# Patient Record
Sex: MALE | Race: WHITE | Employment: FULL TIME | URBAN - METROPOLITAN AREA
[De-identification: names, ages, dates, MRNs, and addresses within clinical notes are randomized per-mention and may not be internally consistent; named-entity substitution may affect disease eponyms.]

---

## 2021-06-07 LAB
CHOLESTEROL, TOTAL: 168 MG/DL
CHOLESTEROL/HDL RATIO: NORMAL
HDLC SERPL-MCNC: 69 MG/DL (ref 35–70)
LDL CHOLESTEROL CALCULATED: 99 MG/DL (ref 0–160)
NONHDLC SERPL-MCNC: NORMAL MG/DL
TRIGL SERPL-MCNC: 156 MG/DL
VLDLC SERPL CALC-MCNC: NORMAL MG/DL

## 2022-04-12 ENCOUNTER — OFFICE VISIT (OUTPATIENT)
Dept: PRIMARY CARE CLINIC | Age: 57
End: 2022-04-12
Payer: COMMERCIAL

## 2022-04-12 VITALS
SYSTOLIC BLOOD PRESSURE: 156 MMHG | HEIGHT: 68 IN | DIASTOLIC BLOOD PRESSURE: 86 MMHG | HEART RATE: 99 BPM | RESPIRATION RATE: 20 BRPM | BODY MASS INDEX: 23.95 KG/M2 | OXYGEN SATURATION: 100 % | WEIGHT: 158 LBS

## 2022-04-12 DIAGNOSIS — K21.9 GASTROESOPHAGEAL REFLUX DISEASE WITHOUT ESOPHAGITIS: ICD-10-CM

## 2022-04-12 DIAGNOSIS — A41.9 SEPSIS WITH ACUTE RENAL FAILURE, DUE TO UNSPECIFIED ORGANISM, UNSPECIFIED ACUTE RENAL FAILURE TYPE, UNSPECIFIED WHETHER SEPTIC SHOCK PRESENT (HCC): ICD-10-CM

## 2022-04-12 DIAGNOSIS — D63.8 ANEMIA OF CHRONIC DISEASE: ICD-10-CM

## 2022-04-12 DIAGNOSIS — I10 PRIMARY HYPERTENSION: ICD-10-CM

## 2022-04-12 DIAGNOSIS — E87.29 ALCOHOLIC KETOACIDOSIS: ICD-10-CM

## 2022-04-12 DIAGNOSIS — J12.1 RSV (RESPIRATORY SYNCYTIAL VIRUS PNEUMONIA): ICD-10-CM

## 2022-04-12 DIAGNOSIS — A41.9 SEPSIS WITH ACUTE RENAL FAILURE, DUE TO UNSPECIFIED ORGANISM, UNSPECIFIED ACUTE RENAL FAILURE TYPE, UNSPECIFIED WHETHER SEPTIC SHOCK PRESENT (HCC): Primary | ICD-10-CM

## 2022-04-12 DIAGNOSIS — E78.5 DYSLIPIDEMIA: ICD-10-CM

## 2022-04-12 DIAGNOSIS — R65.20 SEPSIS WITH ACUTE RENAL FAILURE, DUE TO UNSPECIFIED ORGANISM, UNSPECIFIED ACUTE RENAL FAILURE TYPE, UNSPECIFIED WHETHER SEPTIC SHOCK PRESENT (HCC): ICD-10-CM

## 2022-04-12 DIAGNOSIS — F10.10 ALCOHOL ABUSE: ICD-10-CM

## 2022-04-12 DIAGNOSIS — R65.20 SEPSIS WITH ACUTE RENAL FAILURE, DUE TO UNSPECIFIED ORGANISM, UNSPECIFIED ACUTE RENAL FAILURE TYPE, UNSPECIFIED WHETHER SEPTIC SHOCK PRESENT (HCC): Primary | ICD-10-CM

## 2022-04-12 DIAGNOSIS — M87.051 AVASCULAR NECROSIS OF BONES OF BOTH HIPS (HCC): ICD-10-CM

## 2022-04-12 DIAGNOSIS — N17.9 SEPSIS WITH ACUTE RENAL FAILURE, DUE TO UNSPECIFIED ORGANISM, UNSPECIFIED ACUTE RENAL FAILURE TYPE, UNSPECIFIED WHETHER SEPTIC SHOCK PRESENT (HCC): Primary | ICD-10-CM

## 2022-04-12 DIAGNOSIS — M87.052 AVASCULAR NECROSIS OF BONES OF BOTH HIPS (HCC): ICD-10-CM

## 2022-04-12 DIAGNOSIS — N17.9 SEPSIS WITH ACUTE RENAL FAILURE, DUE TO UNSPECIFIED ORGANISM, UNSPECIFIED ACUTE RENAL FAILURE TYPE, UNSPECIFIED WHETHER SEPTIC SHOCK PRESENT (HCC): ICD-10-CM

## 2022-04-12 LAB
A/G RATIO: 2.2 (ref 1.1–2.2)
ALBUMIN SERPL-MCNC: 4.8 G/DL (ref 3.4–5)
ALP BLD-CCNC: 78 U/L (ref 40–129)
ALT SERPL-CCNC: 24 U/L (ref 10–40)
ANION GAP SERPL CALCULATED.3IONS-SCNC: 15 MMOL/L (ref 3–16)
AST SERPL-CCNC: 20 U/L (ref 15–37)
BASOPHILS ABSOLUTE: 0.1 K/UL (ref 0–0.2)
BASOPHILS RELATIVE PERCENT: 0.6 %
BILIRUB SERPL-MCNC: 0.5 MG/DL (ref 0–1)
BUN BLDV-MCNC: 18 MG/DL (ref 7–20)
CALCIUM SERPL-MCNC: 10.1 MG/DL (ref 8.3–10.6)
CHLORIDE BLD-SCNC: 96 MMOL/L (ref 99–110)
CO2: 23 MMOL/L (ref 21–32)
CREAT SERPL-MCNC: 1 MG/DL (ref 0.9–1.3)
EOSINOPHILS ABSOLUTE: 0.2 K/UL (ref 0–0.6)
EOSINOPHILS RELATIVE PERCENT: 2 %
GFR AFRICAN AMERICAN: >60
GFR NON-AFRICAN AMERICAN: >60
GLUCOSE BLD-MCNC: 102 MG/DL (ref 70–99)
HCT VFR BLD CALC: 36.7 % (ref 40.5–52.5)
HEMOGLOBIN: 12.5 G/DL (ref 13.5–17.5)
LIPASE: 45 U/L (ref 13–60)
LYMPHOCYTES ABSOLUTE: 1.9 K/UL (ref 1–5.1)
LYMPHOCYTES RELATIVE PERCENT: 15.5 %
MCH RBC QN AUTO: 33.1 PG (ref 26–34)
MCHC RBC AUTO-ENTMCNC: 34 G/DL (ref 31–36)
MCV RBC AUTO: 97.4 FL (ref 80–100)
MONOCYTES ABSOLUTE: 1.1 K/UL (ref 0–1.3)
MONOCYTES RELATIVE PERCENT: 8.5 %
NEUTROPHILS ABSOLUTE: 9.1 K/UL (ref 1.7–7.7)
NEUTROPHILS RELATIVE PERCENT: 73.4 %
PDW BLD-RTO: 12.7 % (ref 12.4–15.4)
PLATELET # BLD: 250 K/UL (ref 135–450)
PMV BLD AUTO: 9.3 FL (ref 5–10.5)
POTASSIUM SERPL-SCNC: 4.8 MMOL/L (ref 3.5–5.1)
RBC # BLD: 3.77 M/UL (ref 4.2–5.9)
SODIUM BLD-SCNC: 134 MMOL/L (ref 136–145)
TOTAL CK: 95 U/L (ref 39–308)
TOTAL PROTEIN: 7 G/DL (ref 6.4–8.2)
TSH REFLEX: 1.07 UIU/ML (ref 0.27–4.2)
WBC # BLD: 12.4 K/UL (ref 4–11)

## 2022-04-12 PROCEDURE — 99205 OFFICE O/P NEW HI 60 MIN: CPT | Performed by: FAMILY MEDICINE

## 2022-04-12 RX ORDER — ASPIRIN 81 MG/1
81 TABLET ORAL DAILY
COMMUNITY

## 2022-04-12 RX ORDER — OMEGA-3S/DHA/EPA/FISH OIL/D3 300MG-1000
400 CAPSULE ORAL DAILY
COMMUNITY

## 2022-04-12 RX ORDER — MAGNESIUM OXIDE 400 MG/1
400 TABLET ORAL
COMMUNITY
Start: 2022-03-14 | End: 2022-09-01

## 2022-04-12 RX ORDER — UREA 10 %
LOTION (ML) TOPICAL
COMMUNITY
Start: 2022-03-14 | End: 2022-09-01

## 2022-04-12 RX ORDER — ASCORBIC ACID 250 MG
250 TABLET ORAL 3 TIMES DAILY
COMMUNITY

## 2022-04-12 RX ORDER — SOD SULF/POT CHLORIDE/MAG SULF 1.479 G
TABLET ORAL
COMMUNITY
End: 2022-09-01

## 2022-04-12 RX ORDER — METOPROLOL SUCCINATE 25 MG/1
25 TABLET, EXTENDED RELEASE ORAL DAILY
COMMUNITY
Start: 2022-03-15 | End: 2022-07-25 | Stop reason: SDUPTHER

## 2022-04-12 RX ORDER — POTASSIUM CHLORIDE 750 MG/1
10 TABLET, FILM COATED, EXTENDED RELEASE ORAL DAILY
COMMUNITY
Start: 2022-03-14 | End: 2022-09-01

## 2022-04-12 SDOH — ECONOMIC STABILITY: FOOD INSECURITY: WITHIN THE PAST 12 MONTHS, YOU WORRIED THAT YOUR FOOD WOULD RUN OUT BEFORE YOU GOT MONEY TO BUY MORE.: NEVER TRUE

## 2022-04-12 SDOH — ECONOMIC STABILITY: FOOD INSECURITY: WITHIN THE PAST 12 MONTHS, THE FOOD YOU BOUGHT JUST DIDN'T LAST AND YOU DIDN'T HAVE MONEY TO GET MORE.: NEVER TRUE

## 2022-04-12 ASSESSMENT — PATIENT HEALTH QUESTIONNAIRE - PHQ9
1. LITTLE INTEREST OR PLEASURE IN DOING THINGS: 0
2. FEELING DOWN, DEPRESSED OR HOPELESS: 0
SUM OF ALL RESPONSES TO PHQ QUESTIONS 1-9: 0
SUM OF ALL RESPONSES TO PHQ9 QUESTIONS 1 & 2: 0

## 2022-04-12 ASSESSMENT — ENCOUNTER SYMPTOMS
DIARRHEA: 0
BLOOD IN STOOL: 0
SORE THROAT: 0
COUGH: 0
SHORTNESS OF BREATH: 0
NAUSEA: 0
VOMITING: 0
ABDOMINAL PAIN: 0

## 2022-04-12 ASSESSMENT — SOCIAL DETERMINANTS OF HEALTH (SDOH): HOW HARD IS IT FOR YOU TO PAY FOR THE VERY BASICS LIKE FOOD, HOUSING, MEDICAL CARE, AND HEATING?: NOT HARD AT ALL

## 2022-04-12 NOTE — PATIENT INSTRUCTIONS
Patient Education      Go to the ER ASAP if you develop any chest pain, shortness of breath, facial droop, slurred speech, weakness/numbness in your arms or legs or R eye blindness! Sepsis: Care Instructions  Overview     Sepsis is an intense reaction to an infection. It can cause damage to the body and lead to dangerously low blood pressure. You may have inflammation across large areas of your body. It can damage tissue and even go deep into yourorgans. Infections that can lead to sepsis include:   A skin infection such as from a cut.  A lung infection like pneumonia.  A kidney infection.  A gut infection such as E. coli. Sepsis is treated with antibiotics. Your doctor will try to find the infection that led to sepsis. Theador Morgan also get fluids through a vein (IV). Machines will track your vital signs, including temperature, blood pressure, breathing rate,and pulse rate. The physical and mental effects of sepsis may not be seen for several weeksafter treatment. And they may last long after the infection is gone. Physical problems may include:   Feeling weak and tired.  Feeling out of breath.  Aches and pains.  Problems with getting around.  Trouble falling asleep or staying asleep.  Dry and itchy skin, brittle nails, and hair loss. Some of these effects can lead to problems with your organs or your feet, legs,hands, or arms. Sepsis can also affect your mind and emotions. Problems may include:   Self-doubt.  Anxiety.  Nightmares.  Depression and mood problems.  Wanting to avoid other people.  Confusion.  Flashbacks and bad memories of your illness. It's important to care for yourself and try to avoid infections. This may loweryour risk of getting sepsis again. Follow-up care is a key part of your treatment and safety. Be sure to make and go to all appointments, and call your doctor if you are having problems.  It's also a good idea to know your test results and keep alist of the medicines you take. How can you care for yourself at home?  Be safe with medicines. Take your medicines exactly as prescribed. Call your doctor if you think you are having a problem with your medicine.  If your doctor prescribed antibiotics, take them as directed. Do not stop taking them just because you feel better. You need to take the full course of antibiotics.  Help prevent infections that could again lead to sepsis. ? Try to avoid colds and flu. If you must be around people who have a cold or the flu, wash your hands often. And get a flu vaccine every year. ? Ask your doctor if you need a pneumococcal vaccine (to prevent pneumonia, meningitis, and other infections). If you have had one before, ask your doctor if you need another dose. ? Clean any wounds or scrapes.  Do not smoke or use other tobacco products. When you quit smoking, you are less likely to get a cold, the flu, bronchitis, and pneumonia. If you need help quitting, talk to your doctor about stop-smoking programs and medicines. These can increase your chances of quitting for good.  Drink plenty of fluids to prevent dehydration. Choose water and other clear liquids until you feel better. If you have kidney, heart, or liver disease and have to limit fluids, talk with your doctor before you increase the amount of fluids you drink.  Eat a healthy diet. Include fruits, vegetables, and whole grains in your diet every day.  If your doctor recommends it, try doing some physical activity. Walking is a good choice. Bit by bit, increase the amount you walk every day.  Talk with your family and friends about your challenges. Ask for help if you need it.  Keep a journal. Writing down your thoughts and feelings can help reduce your stress.  Ask family members to fill in gaps in your memory.  Set small goals for yourself that you can reach. Reward yourself for success. When should you call for help?    Call 911  anytime you think you may need emergency care. For example, call if:     You passed out (lost consciousness). Call your doctor now or seek immediate medical care if:     You have symptoms such as:  ? Shortness of breath. ? Feeling very sick. ? Severe pain. ? A fast heart rate. ? Cool, pale, or clammy skin. ? Feeling confused. ? Feeling very sleepy, or you are hard to wake up.      You are dizzy or lightheaded, or you feel like you may faint.      You have a fever or chills. Watch closely for changes in your health, and be sure to contact your doctor if:     You do not get better as expected. Where can you learn more? Go to https://[employer]eLifestyles. org and sign in to your StarsVu account. Enter G001 in the [employer] box to learn more about \"Sepsis: Care Instructions. \"     If you do not have an account, please click on the \"Sign Up Now\" link. Current as of: July 1, 2021               Content Version: 13.2  © 2006-2022 Healthwise, Incorporated. Care instructions adapted under license by [facility] ([facility]). If you have questions about a medical condition or this instruction, always ask your healthcare professional. [place] [address] any warranty or liability for your use of this information.

## 2022-04-12 NOTE — PROGRESS NOTES
Chief Complaint   Patient presents with    New Patient     NP to establish care    Other     Pt in hospital in [place] for RSV with sepsis, alcoholic ketoacidosis, necrosis of bilateral femur head, chronic kidney disease          Marino Landau is a 61 y.o. male who presents for hospital followup visit. While on vacation in [place] pt had a dry cough, anorexia, diarrhea, lightheadedness and confusion. Pt went to the ER in [place] and was Dx with sepsis, alcoholic ketoacidosis and ARF and was admitted and treated with IVFs and IV antibiotics for 48 hours with improvement and was discharged home on a course of Augmentin antibiotic and atrovent inhaler as needed.  Pt was found to have B/L hip avascular necrosis in the hospital    Pt has a hx of HTN and dyslipidemia and was on losartan, hctz, lipitor 80 mg and a Baby ASA  but during his hospitalization his BP medications were discontinued and he was eventually stated on lopressor 25 mg bid medication for BP control    Pt denies any hx of CAD, kidney disease or diabetes    Pt is on prilosec for GERD and has a hx of chronic anemia and is followed by Hematology    Upon hospital discharge pt was started on mag oxide 094 mg bid, folic acid 1 mg daily, FESO4 325 mg TID and potassium 10 Meq daily    bloodwork upon discharge 03/14/2022 :  glucose 157 BUN 8  Na 132  pot 4.0  Cl 107 CO2 19 creat 0.89  calcium 8.3  alk phos 54  tot prot 6.8  albumin 3.4  ALT 28  AST 31  t bili 0.4  WBC 12.9  HgB 9.4  MCV 94.7 platelets 662    Pt had a colonoscopy 12/30/2021 which was normal except fpor several benign polyps and mild diverticulosis    Pt has a hx of low testosterone and used to be on shots in the past but quit taking them about 4 years ago    Pt has 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    Pt has a hx of basal cell cancer nose and uvula s/p excision 2015 and did not require any chemo or radiation and pt Quit smoking at that time    Surgical hx includes L5 - S1 lumbar disc surgery 20 years ago    Pt did complete his COVID vaccine series    Pt is an exsmoker 1 PPD x 30 years and was using alcohol 8 - 10 beers and 2 shots daily for 30 years and stayed sober for 1 month s/p hospitalization but has resumed drinking 2 beers 3 times a week; Pt smokes marijuana daily    FHx positive for alcoholism and early CAD(father) and glaucoma (mother)       Hospital admission note 03/12/2022  Kayleen Garcia is a 61 y.o. male, visiting from [place], with a history of hyperlipidemia, hypertension, anemia, and alcoholism, who presented to the ED earlier this morning with complaints of persistent cough, mild shortness of breath, weakness, nausea vomiting, diarrhea, body aches, and lightheadedness for the past 4 days. Patient states that he flew in from [place] 4 days ago for vacation, and his symptoms started shortly prior to his flight. He states that he was having a family dinner at the [employer] in [place] and started to notice his symptoms develop later that night. He does not endorse eating any uncooked/raw foods recently. The patient also denies being exposed to any sick contacts, immunocompromise contacts, and he does not have any children in the household. He currently denies any chest pain, fever, or any other complaints besides those listed above. Patient states that he is currently fully vaccinated and boosted against COVID-19 as well as his wife, who is present at bedside. The patient states that he and his wife have been drinking \"socially\" since flying in. He states that he usually drinks 2-3 cocktails along with some shots of hard liquor in their room prior to going out. He also states that the drinks socially at home with tendency to binge drink at times. He admits that they do not drink often, but when they do, they drink a large amount of alcohol.  Him and his wife seem pretty open to the idea that they are likely suffering from alcoholism and unwilling to listen to counseling and rehab suggestions. He also admits that he has been unable to hold down any food due to being sick for the last 4 days. They are supposed to fly back to [facility] in 3 days. In the ED, the patient was found to have several abnormal labs. His white blood cell count was greatly elevated at 23.4. His creatinine was elevated at 1.7. His serum CO2 was found to be decreased at 14 and anion gap elevated at 16. Potassium was also low at 2.9 and magnesium was undetectable. Ammonia level was also elevated at 41, and lactic acid elevated at 3.0. Beta hydroxybutyrate was also elevated at 4.14. A nasal swab returned positive for RSV. The patient was informed of the results and informed that he will be admitted to the [facility] for alcoholic ketosis as well as sepsis, suspect secondary to RSV. Principal Problem:  Severe sepsis ([facility] [address] Risk Code)    1. Severe Sepsis with Lactic Acidosis, suspect 2nd to RSV, rule out bacterial/fungal infections: Improving.   --Will provide maintenance IVF   --Will give IV Vanc and IV Zosyn  --Blood cultures obtained, results pending  --Urine cultures obtained, results pending. --F/u LA, ESR, CRP, and procalcitonin level prn  --Check BMP, Mag, Phos levels q6hrs  --Lactic acid initially elevated at 2.9. LA improved to 1.2 on 3/13. --Will provide NC O2 prn for resp support. --Monitor on tele   --Tylenol for fever and Ultram for moderate to severe pain   --NS Bolus prn for resuscitation   --Daily CBC, CMP, Mg, P levels. --Droplet precautions  --Management of RSV infection as listed below     2. SOB with productive cough, found to have RSV Pneumonia. --Patient started on IV Zosyn for prevention of bacterial pneumonia.    --Will obtain sputum culture and Gram stain and RPP  --Will repeat chest x-ray and ABG prn  --Will provide duoneb breathing treatments every 6 hours   --Will provide nasal cannula oxygen when necessary for breathing support. --Continue tramadol and tylenol when necessary for pain  --Continue pulse oximetry would vitals every 4 hours. If Patient continues to desat, may need continuous pulse oximetry. --Will provide Mucinex and robitussin when necessary for cough. --Droplet precautions     3. Alcoholic Ketosis/Metabolic Acidosis 2nd Acute on Chronic alcohol abuse/binge drinking:  -Patient admits to binge drinking at times especially while on vacation. Drinks several shots prior to going out and drinking more. -Beta hydroxybutyrate level elevated at 4.14 with elevated lactic acid level  - No evidence of withdrawal or DTs at this time   - UDS pending, BAL <11  - multivitamin, folic acid, thiamine  - Monitor electrolytes   - Ok to use amp of Bicarb prn for treatment of acidosis ONCE the magnesium and Potassium levels are nearly corrected (K >3.3, Mag >1.6)  - CIWA protocol with IV/PO Ativan prn for symptom triggered therapy as needed  - Seizure precautions  - monitor for evidence of withdrawal   - Patient educated extensively to refrain from alcohol abuse for greater than 15 minutes  - Provide aggressive IV fluids for treatment of alcoholic ketosis  - Check BMP, mag, Phos levels every 6 hours until acidosis has resolved. - SW and MAT program consulted      4. Hypokalemia:  Severe Hypomagnesemia:  --Replace as indicated  --f/u BMP in am.   --Monitor vitals. --Patient started on IMCU replacement protocol   --Repeat EKG prn   --Patient started on Magnesium supplementation with 400 mg p.o. twice daily     5. Acute renal failure   Mild Rhabdomyolysis  --Suspect secondary to alcoholic ketosis along with sepsis/infection. --Current crt is 1.7. Baseline is <1.2  --Will provide aggressive hydration   --Monitor BMP every 6 hours   --Avoid Nephrotoxins   --Should improve with treatment of infections and management of alcoholic ketosis and cessation from alcohol     6. Hyperammonemia: Improving  --NH 41 on admission.  Has PLATELIKE ATELECTASIS. DIVERTICULOSIS WITHOUT CT EVIDENCE OF DIVERTICULITIS. BILATERAL LEFT GREATER THAN RIGHT FEMORAL HEAD AVASCULAR NECROSIS WITHOUT   COLLAPSE. Review of Systems   Constitutional: Negative for fatigue and fever. HENT: Positive for congestion (at times and uses OTC flonase with good control). Negative for nosebleeds and sore throat. Eyes:        Positive blindness in L eye   Respiratory: Negative for cough and shortness of breath. Cardiovascular: Positive for leg swelling (at times). Negative for chest pain and palpitations. Gastrointestinal: Negative for abdominal pain, blood in stool, diarrhea, nausea and vomiting. Positive melena from iron pills; Negative indigestion   Endocrine: Negative for polydipsia and polyuria. Genitourinary: Negative for dysuria and hematuria. Musculoskeletal: Negative for arthralgias. Skin: Negative for rash. Neurological: Negative for dizziness, seizures, syncope, speech difficulty, weakness and headaches. Psychiatric/Behavioral: Negative for dysphoric mood. The patient is not nervous/anxious. Vitals:    04/12/22 1325   BP: (!) 156/86   Pulse:    Resp:    SpO2:          Physical Exam  Vitals reviewed. Constitutional:       General: He is not in acute distress. HENT:      Mouth/Throat:      Mouth: Mucous membranes are moist.      Pharynx: Oropharynx is clear. Eyes:      General: No scleral icterus. Comments: Pink conjunctivae    Neck:      Thyroid: No thyromegaly. Comments: No carotid bruits noted B/L  Cardiovascular:      Heart sounds: Normal heart sounds. No murmur heard. No friction rub. No gallop. Pulmonary:      Effort: Pulmonary effort is normal.      Breath sounds: Normal breath sounds. Abdominal:      Palpations: Abdomen is soft. Tenderness: There is no abdominal tenderness. Musculoskeletal:      Comments: No leg edema noted B/L   Lymphadenopathy:      Cervical: No cervical adenopathy. Skin:     Findings: No rash. Neurological:      Mental Status: He is alert. Comments: Cranial nerves 2 - 12 grossly intact; Muscle strength 5/5 throughout; No asterixis noted   Psychiatric:         Mood and Affect: Mood normal.         ASSESSMENT AND PLAN    1. Gastroesophageal reflux disease without esophagitis  Patient clinically stable on present medications and is with a nontender abdomen on PE and his reported melena is secondary to his Fe supplementation  - CBC with Auto Differential; Future    2. Primary hypertension  Pt with decent control on B blocker and denies any CP or SOB  - CBC with Auto Differential; Future  - Comprehensive Metabolic Panel; Future    3. Dyslipidemia  Patient clinically stable on statin medication and is with a nonfocal neuro exam and will check CPK level bloodwork  - Comprehensive Metabolic Panel; Future    4. Alcohol abuse  Pt was warned of further health issues with continued abuse and has decreased his intake to 2 beers 3 times a week. Pt is with anicteric sclerae and no asterixis on PE   - Comprehensive Metabolic Panel; Future  - Lipase; Future    5. Sepsis with acute renal failure, due to unspecified organism, unspecified acute renal failure type, unspecified whether septic shock present ([facility] [address]  Pt clinically improved s/p treatment with IVFs and IV antibiotics and denies any fever and is with stable VS  - CBC with Auto Differential; Future  - Comprehensive Metabolic Panel; Future    6. RSV (respiratory syncytial virus pneumonia)  Pt clinically improved with albuterol nebs and is with clear lung fields on PE    7. Alcoholic ketoacidosis  Will check bloodwork to insure stable electrolytes and kidney function  - CBC with Auto Differential; Future  - CK; Future  - Comprehensive Metabolic Panel; Future  - Lipase; Future  - TSH with Reflex; Future    8.  Anemia of chronic disease  Pt is followed by Hematology and most likely is multifactorial in nature and need to obtain recent correspondence for review  - Testosterone; Future    9. Avascular necrosis of bones of both hips (HCC)  Pt denies any hip pain and most likely is secondary to pt's alcohol abuse and will Refer pt to Ortho for evaluation  - Orlando Murillo MD, Orthopedic Surgery, [facility]        Emily Kaplan MD      Return in about 6 weeks (around 5/24/2022). Patient Instructions       Patient Education      Go to the ER ASAP if you develop any chest pain, shortness of breath, facial droop, slurred speech, weakness/numbness in your arms or legs or R eye blindness! Sepsis: Care Instructions  Overview     Sepsis is an intense reaction to an infection. It can cause damage to the body and lead to dangerously low blood pressure. You may have inflammation across large areas of your body. It can damage tissue and even go deep into yourorgans. Infections that can lead to sepsis include:   A skin infection such as from a cut.  A lung infection like pneumonia.  A kidney infection.  A gut infection such as E. coli. Sepsis is treated with antibiotics. Your doctor will try to find the infection that led to sepsis. Juan Daniel Blankenship also get fluids through a vein (IV). Machines will track your vital signs, including temperature, blood pressure, breathing rate,and pulse rate. The physical and mental effects of sepsis may not be seen for several weeksafter treatment. And they may last long after the infection is gone. Physical problems may include:   Feeling weak and tired.  Feeling out of breath.  Aches and pains.  Problems with getting around.  Trouble falling asleep or staying asleep.  Dry and itchy skin, brittle nails, and hair loss. Some of these effects can lead to problems with your organs or your feet, legs,hands, or arms. Sepsis can also affect your mind and emotions. Problems may include:   Self-doubt.  Anxiety.  Nightmares.  Depression and mood problems.    Wanting to avoid other people.  Confusion.  Flashbacks and bad memories of your illness. It's important to care for yourself and try to avoid infections. This may loweryour risk of getting sepsis again. Follow-up care is a key part of your treatment and safety. Be sure to make and go to all appointments, and call your doctor if you are having problems. It's also a good idea to know your test results and keep alist of the medicines you take. How can you care for yourself at home?  Be safe with medicines. Take your medicines exactly as prescribed. Call your doctor if you think you are having a problem with your medicine.  If your doctor prescribed antibiotics, take them as directed. Do not stop taking them just because you feel better. You need to take the full course of antibiotics.  Help prevent infections that could again lead to sepsis. ? Try to avoid colds and flu. If you must be around people who have a cold or the flu, wash your hands often. And get a flu vaccine every year. ? Ask your doctor if you need a pneumococcal vaccine (to prevent pneumonia, meningitis, and other infections). If you have had one before, ask your doctor if you need another dose. ? Clean any wounds or scrapes.  Do not smoke or use other tobacco products. When you quit smoking, you are less likely to get a cold, the flu, bronchitis, and pneumonia. If you need help quitting, talk to your doctor about stop-smoking programs and medicines. These can increase your chances of quitting for good.  Drink plenty of fluids to prevent dehydration. Choose water and other clear liquids until you feel better. If you have kidney, heart, or liver disease and have to limit fluids, talk with your doctor before you increase the amount of fluids you drink.  Eat a healthy diet. Include fruits, vegetables, and whole grains in your diet every day.  If your doctor recommends it, try doing some physical activity. Walking is a good choice.  Bit by bit, increase the amount you walk every day.  Talk with your family and friends about your challenges. Ask for help if you need it.  Keep a journal. Writing down your thoughts and feelings can help reduce your stress.  Ask family members to fill in gaps in your memory.  Set small goals for yourself that you can reach. Reward yourself for success. When should you call for help? Call 911  anytime you think you may need emergency care. For example, call if:     You passed out (lost consciousness). Call your doctor now or seek immediate medical care if:     You have symptoms such as:  ? Shortness of breath. ? Feeling very sick. ? Severe pain. ? A fast heart rate. ? Cool, pale, or clammy skin. ? Feeling confused. ? Feeling very sleepy, or you are hard to wake up.      You are dizzy or lightheaded, or you feel like you may faint.      You have a fever or chills. Watch closely for changes in your health, and be sure to contact your doctor if:     You do not get better as expected. Where can you learn more? Go to https://[employer]. org and sign in to your Co-Work account. Enter T784 in the [employer] box to learn more about \"Sepsis: Care Instructions. \"     If you do not have an account, please click on the \"Sign Up Now\" link. Current as of: July 1, 2021               Content Version: 13.2  © 2624-3823 Healthwise, Incorporated. Care instructions adapted under license by [facility] ([facility]). If you have questions about a medical condition or this instruction, always ask your healthcare professional. [address] any warranty or liability for your use of this information.

## 2022-04-14 LAB — TESTOSTERONE TOTAL: 186 NG/DL (ref 220–1000)

## 2022-04-18 DIAGNOSIS — R79.89 LOW TESTOSTERONE IN MALE: Primary | ICD-10-CM

## 2022-05-26 ENCOUNTER — OFFICE VISIT (OUTPATIENT)
Dept: PRIMARY CARE CLINIC | Age: 57
End: 2022-05-26
Payer: COMMERCIAL

## 2022-05-26 VITALS
DIASTOLIC BLOOD PRESSURE: 78 MMHG | OXYGEN SATURATION: 98 % | TEMPERATURE: 97.9 F | BODY MASS INDEX: 25.76 KG/M2 | RESPIRATION RATE: 16 BRPM | SYSTOLIC BLOOD PRESSURE: 134 MMHG | HEART RATE: 68 BPM | HEIGHT: 68 IN | WEIGHT: 170 LBS

## 2022-05-26 DIAGNOSIS — M87.051 AVASCULAR NECROSIS OF BONES OF BOTH HIPS (HCC): ICD-10-CM

## 2022-05-26 DIAGNOSIS — E78.5 DYSLIPIDEMIA: ICD-10-CM

## 2022-05-26 DIAGNOSIS — M87.052 AVASCULAR NECROSIS OF BONES OF BOTH HIPS (HCC): ICD-10-CM

## 2022-05-26 DIAGNOSIS — I10 PRIMARY HYPERTENSION: Primary | ICD-10-CM

## 2022-05-26 DIAGNOSIS — F10.10 ALCOHOL ABUSE: ICD-10-CM

## 2022-05-26 DIAGNOSIS — R79.89 LOW TESTOSTERONE IN MALE: ICD-10-CM

## 2022-05-26 DIAGNOSIS — K21.9 GASTROESOPHAGEAL REFLUX DISEASE WITHOUT ESOPHAGITIS: ICD-10-CM

## 2022-05-26 PROCEDURE — 99214 OFFICE O/P EST MOD 30 MIN: CPT | Performed by: FAMILY MEDICINE

## 2022-05-26 ASSESSMENT — ENCOUNTER SYMPTOMS
COUGH: 0
BLOOD IN STOOL: 0
VOMITING: 0
SORE THROAT: 0
SHORTNESS OF BREATH: 0
ABDOMINAL PAIN: 0
NAUSEA: 0

## 2022-05-26 NOTE — PATIENT INSTRUCTIONS
Patient Education      Go to the ER ASAP if you develop any chest pain, shortness of breath, facial droop, slurred speech or weakness/numbness in your arms or legs! High Blood Pressure: Care Instructions  Overview     It's normal for blood pressure to go up and down throughout the day. But if it stays up, you have high blood pressure. Another name for high blood pressure ishypertension. Despite what a lot of people think, high blood pressure usually doesn't cause headaches or make you feel dizzy or lightheaded. It usually has no symptoms. But it does increase your risk of stroke, heart attack, and other problems. You and your doctor will talk about your risks of these problems based on yourblood pressure. Your doctor will give you a goal for your blood pressure. Your goal will bebased on your health and your age. Lifestyle changes, such as eating healthy and being active, are always important to help lower blood pressure. You might also take medicine to reachyour blood pressure goal.  Follow-up care is a key part of your treatment and safety. Be sure to make and go to all appointments, and call your doctor if you are having problems. It's also a good idea to know your test results and keep alist of the medicines you take. How can you care for yourself at home? Medical treatment   If you stop taking your medicine, your blood pressure will go back up. You may take one or more types of medicine to lower your blood pressure. Be safe with medicines. Take your medicine exactly as prescribed. Call your doctor if you think you are having a problem with your medicine.  Talk to your doctor before you start taking aspirin every day. Aspirin can help certain people lower their risk of a heart attack or stroke. But taking aspirin isn't right for everyone, because it can cause serious bleeding.  See your doctor regularly.  You may need to see the doctor more often at first or until your blood pressure comes down.   If you are taking blood pressure medicine, talk to your doctor before you take decongestants or anti-inflammatory medicine, such as ibuprofen. Some of these medicines can raise blood pressure.  Learn how to check your blood pressure at home. Lifestyle changes   Stay at a healthy weight. This is especially important if you put on weight around the waist. Losing even 10 pounds can help you lower your blood pressure.  If your doctor recommends it, get more exercise. Walking is a good choice. Bit by bit, increase the amount you walk every day. Try for at least 30 minutes on most days of the week. You also may want to swim, bike, or do other activities.  Avoid or limit alcohol. Talk to your doctor about whether you can drink any alcohol.  Try to limit how much sodium you eat to less than 2,300 milligrams (mg) a day. Your doctor may ask you to try to eat less than 1,500 mg a day.  Eat plenty of fruits (such as bananas and oranges), vegetables, legumes, whole grains, and low-fat dairy products.  Lower the amount of saturated fat in your diet. Saturated fat is found in animal products such as milk, cheese, and meat. Limiting these foods may help you lose weight and also lower your risk for heart disease.  Do not smoke. Smoking increases your risk for heart attack and stroke. If you need help quitting, talk to your doctor about stop-smoking programs and medicines. These can increase your chances of quitting for good. When should you call for help? Call 911  anytime you think you may need emergency care. This may mean having symptoms that suggest that your blood pressure is causing a serious heart or blood vessel problem. Your blood pressure may be over 180/120. For example, call 911 if:     You have symptoms of a heart attack. These may include:  ? Chest pain or pressure, or a strange feeling in the chest.  ? Sweating. ? Shortness of breath. ? Nausea or vomiting.   ? Pain, pressure, or a strange feeling in the back, neck, jaw, or upper belly or in one or both shoulders or arms. ? Lightheadedness or sudden weakness. ? A fast or irregular heartbeat.      You have symptoms of a stroke. These may include:  ? Sudden numbness, tingling, weakness, or loss of movement in your face, arm, or leg, especially on only one side of your body. ? Sudden vision changes. ? Sudden trouble speaking. ? Sudden confusion or trouble understanding simple statements. ? Sudden problems with walking or balance. ? A sudden, severe headache that is different from past headaches.      You have severe back or belly pain. Do not wait until your blood pressure comes down on its own. Get help right away. Call your doctor now or seek immediate care if:     Your blood pressure is much higher than normal (such as 180/120 or higher), but you don't have symptoms.      You think high blood pressure is causing symptoms, such as:  ? Severe headache.  ? Blurry vision. Watch closely for changes in your health, and be sure to contact your doctor if:     Your blood pressure measures higher than your doctor recommends at least 2 times. That means the top number is higher or the bottom number is higher, or both.      You think you may be having side effects from your blood pressure medicine. Where can you learn more? Go to https://FreeMonee.Teachernow. org and sign in to your [employer]ern[employer] account. Enter O488 in the [employer] box to learn more about \"High Blood Pressure: Care Instructions. \"     If you do not have an account, please click on the \"Sign Up Now\" link. Current as of: January 10, 2022               Content Version: 13.2  © 6360-6094 Healthwise, Incorporated. Care instructions adapted under license by [facility] ([facility]).  If you have questions about a medical condition or this instruction, always ask your healthcare professional. [address]  any warranty or liability for your use of this information.

## 2022-05-26 NOTE — PROGRESS NOTES
Chief Complaint   Patient presents with    Follow-up     pt here for follow up visit         Felipe John is a 61 y.o. male who presents for followup visit regarding HTN, dyslipidemia, GERD and alcohol abuse. Pt did not see Ortho yet regarding his hip avascular necrosis    Patient did recent bloodwork [CBC, CMP, CK, lipase, TSH, testosterone] which was unremarkable except for a low testosterone level 186 and will thus refer patient to Urology for evaluation and treatment. Patient also is with an elevated WBC 12.4 (with neutrophils 9.1) along with a mild normocytic anemia with HgB 12.5 which is most likely secondary to his recent sepsis and will repeat CBC bloodwork at his next office visit ---- Pt did see Urology and was started on testosterone gel daily    Pt has a hx of HTN and dyslipidemia and is on lopressor 25 mg bid medication  and a Baby ASA     Pt denies any hx of CAD, kidney disease or diabetes     Pt is on prilosec for GERD and has a hx of chronic anemia and is followed by Hematology    Pt had a colonoscopy 12/30/2021 which was normal except for several benign polyps and mild diverticulosis     Pt has a hx of low testosterone and used to be on shots in the past but quit taking them about 4 years ago     Pt has 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     Pt has a hx of basal cell cancer nose and uvula s/p excision 2015 and did not require any chemo or radiation and pt Quit smoking at that time and is followed by Dermatology     Surgical hx includes L5 - S1 lumbar disc surgery 20 years ago     Pt did complete his COVID vaccine series     Pt is an exsmoker 1 PPD x 30 years and was using alcohol 8 - 10 beers and 2 shots daily for 30 years and stayed sober for 1 month s/p hospitalization but has resumed drinking 2 beers 3 times a week;  Pt smokes marijuana daily     FHx positive for alcoholism and early CAD(father) and glaucoma (mother)       Review of Systems Muscle strength 5/5 throughout   Psychiatric:         Mood and Affect: Mood normal.         ASSESSMENT AND PLAN    1. Primary hypertension  Patient clinically stable on present medications and denies any CP or SOB and had recent normal CMP bloodwork    2. Low testosterone in male  Pt is being managed by Urology    3. Alcohol abuse  Pt was told to limit his intake to at most 6 drinks weekly    4. Gastroesophageal reflux disease without esophagitis  Patient clinically stable on present medications and is with a nontender abdomen on PE    5. Dyslipidemia  Patient clinically stable on present medications and is with a nonfocal neuro exam    6. Avascular necrosis of bones of both hips ([address]  Pt was told to see Ortho as directed for evaluation        Sukhdeep Leigh MD      Return in about 3 months (around 8/26/2022). Patient Instructions       Patient Education      Go to the ER ASAP if you develop any chest pain, shortness of breath, facial droop, slurred speech or weakness/numbness in your arms or legs! High Blood Pressure: Care Instructions  Overview     It's normal for blood pressure to go up and down throughout the day. But if it stays up, you have high blood pressure. Another name for high blood pressure ishypertension. Despite what a lot of people think, high blood pressure usually doesn't cause headaches or make you feel dizzy or lightheaded. It usually has no symptoms. But it does increase your risk of stroke, heart attack, and other problems. You and your doctor will talk about your risks of these problems based on yourblood pressure. Your doctor will give you a goal for your blood pressure. Your goal will bebased on your health and your age. Lifestyle changes, such as eating healthy and being active, are always important to help lower blood pressure. You might also take medicine to reachyour blood pressure goal.  Follow-up care is a key part of your treatment and safety.  Be sure to make and go to all appointments, and call your doctor if you are having problems. It's also a good idea to know your test results and keep alist of the medicines you take. How can you care for yourself at home? Medical treatment   If you stop taking your medicine, your blood pressure will go back up. You may take one or more types of medicine to lower your blood pressure. Be safe with medicines. Take your medicine exactly as prescribed. Call your doctor if you think you are having a problem with your medicine.  Talk to your doctor before you start taking aspirin every day. Aspirin can help certain people lower their risk of a heart attack or stroke. But taking aspirin isn't right for everyone, because it can cause serious bleeding.  See your doctor regularly. You may need to see the doctor more often at first or until your blood pressure comes down.  If you are taking blood pressure medicine, talk to your doctor before you take decongestants or anti-inflammatory medicine, such as ibuprofen. Some of these medicines can raise blood pressure.  Learn how to check your blood pressure at home. Lifestyle changes   Stay at a healthy weight. This is especially important if you put on weight around the waist. Losing even 10 pounds can help you lower your blood pressure.  If your doctor recommends it, get more exercise. Walking is a good choice. Bit by bit, increase the amount you walk every day. Try for at least 30 minutes on most days of the week. You also may want to swim, bike, or do other activities.  Avoid or limit alcohol. Talk to your doctor about whether you can drink any alcohol.  Try to limit how much sodium you eat to less than 2,300 milligrams (mg) a day. Your doctor may ask you to try to eat less than 1,500 mg a day.  Eat plenty of fruits (such as bananas and oranges), vegetables, legumes, whole grains, and low-fat dairy products.  Lower the amount of saturated fat in your diet.  Saturated fat is found in animal products such as milk, cheese, and meat. Limiting these foods may help you lose weight and also lower your risk for heart disease.  Do not smoke. Smoking increases your risk for heart attack and stroke. If you need help quitting, talk to your doctor about stop-smoking programs and medicines. These can increase your chances of quitting for good. When should you call for help? Call 911  anytime you think you may need emergency care. This may mean having symptoms that suggest that your blood pressure is causing a serious heart or blood vessel problem. Your blood pressure may be over 180/120. For example, call 911 if:     You have symptoms of a heart attack. These may include:  ? Chest pain or pressure, or a strange feeling in the chest.  ? Sweating. ? Shortness of breath. ? Nausea or vomiting. ? Pain, pressure, or a strange feeling in the back, neck, jaw, or upper belly or in one or both shoulders or arms. ? Lightheadedness or sudden weakness. ? A fast or irregular heartbeat.      You have symptoms of a stroke. These may include:  ? Sudden numbness, tingling, weakness, or loss of movement in your face, arm, or leg, especially on only one side of your body. ? Sudden vision changes. ? Sudden trouble speaking. ? Sudden confusion or trouble understanding simple statements. ? Sudden problems with walking or balance. ? A sudden, severe headache that is different from past headaches.      You have severe back or belly pain. Do not wait until your blood pressure comes down on its own. Get help right away. Call your doctor now or seek immediate care if:     Your blood pressure is much higher than normal (such as 180/120 or higher), but you don't have symptoms.      You think high blood pressure is causing symptoms, such as:  ? Severe headache.  ? Blurry vision.    Watch closely for changes in your health, and be sure to contact your doctor if:     Your blood pressure measures higher than your doctor recommends at least 2 times. That means the top number is higher or the bottom number is higher, or both.      You think you may be having side effects from your blood pressure medicine. Where can you learn more? Go to https://ResiModelmaxineImmediatelyeb.BioCatch. org and sign in to your Healthcentrix account. Enter D409 in the [employer]ik box to learn more about \"High Blood Pressure: Care Instructions. \"     If you do not have an account, please click on the \"Sign Up Now\" link. Current as of: January 10, 2022               Content Version: 13.2  © 1345-6429 Healthwise, Incorporated. Care instructions adapted under license by [facility] ([facility]). If you have questions about a medical condition or this instruction, always ask your healthcare professional. [address] any warranty or liability for your use of this information.

## 2022-06-02 ENCOUNTER — OFFICE VISIT (OUTPATIENT)
Dept: ORTHOPEDIC SURGERY | Age: 57
End: 2022-06-02

## 2022-06-02 VITALS — WEIGHT: 170 LBS | HEIGHT: 68 IN | BODY MASS INDEX: 25.76 KG/M2

## 2022-06-02 DIAGNOSIS — M25.552 LEFT HIP PAIN: Primary | ICD-10-CM

## 2022-06-02 DIAGNOSIS — M25.551 RIGHT HIP PAIN: ICD-10-CM

## 2022-06-02 PROCEDURE — 99203 OFFICE O/P NEW LOW 30 MIN: CPT | Performed by: ORTHOPAEDIC SURGERY

## 2022-06-02 NOTE — Clinical Note
Dear Dr. Isidra Rogers,    Thank you very much for the referral and allowing me to participate in this patient's care. Please see the attached note for full details of the visit.     With kind regards,  Glory Marin MD

## 2022-06-02 NOTE — PROGRESS NOTES
2022     Reason for visit:  Bilateral hip evaluation    History of Present Illness: The patient is a 69-year-old male who presents for evaluation of his bilateral hips. He presents as a referral from Dr. Torie Gaming. He reports that while he was in [place] he was having chest issues and as result of work-up it sounds like he had a CT scan of the chest abdomen pelvis. It also sounds like he likely had findings consistent with AVN of the bilateral hips that was found incidentally on the scan. So he presents for further evaluation and work-up. He denies hip pain. He denies long-term oral steroid use. However he does admit to a history of heavy alcohol usage. He has been able to decrease his usage in recent years but overall does report high levels of alcohol intake.     Medical History:  Past Medical History:   Diagnosis Date    ED (erectile dysfunction)     FH: CAD (coronary artery disease)     GERD (gastroesophageal reflux disease)     HLD (hyperlipidemia)     HTN (hypertension)     MTHFR mutation     heterozygous; takes vitamins (B6, B12, folate)      Past Surgical History:   Procedure Laterality Date    SKIN CANCER EXCISION  2015    Melanoma on face    SPINE SURGERY      UVULECTOMY  2015    WISDOM TOOTH EXTRACTION        Family History   Problem Relation Age of Onset    Heart Attack Father     Heart Disease Father     Hypertension Mother    [facility] Elevated Lipids Sister     Elevated Lipids Father       Social History     Socioeconomic History    Marital status:      Spouse name: Not on file    Number of children: Not on file    Years of education: Not on file    Highest education level: Not on file   Occupational History    Not on file   Tobacco Use    Smoking status: Former Smoker     Packs/day: 1.00     Years: 30.00     Pack years: 30.00     Types: Cigarettes     Quit date:      Years since quittin.4    Smokeless tobacco: Never Used   Substance and Sexual Activity    Alcohol use: Yes     Alcohol/week: 98.0 standard drinks     Types: 56 Cans of beer, 42 Shots of liquor per week     Comment: Pt previous use of alcohol daily x35 years 6 shots per night along with 8 beers, recently cut down to occasional use    Drug use: Yes     Frequency: 7.0 times per week     Types: Marijuana [place])     Comment: daily use-no medical card    Sexual activity: Not on file   Other Topics Concern    Not on file   Social History Narrative    Not on file     Social Determinants of Health     Financial Resource Strain: Low Risk     Difficulty of Paying Living Expenses: Not hard at all   Food Insecurity: No Food Insecurity    Worried About Running Out of Food in the Last Year: Never true    [place] of Food in the Last Year: Never true   Transportation Needs:     Lack of Transportation (Medical): Not on file    Lack of Transportation (Non-Medical):  Not on file   Physical Activity:     Days of Exercise per Week: Not on file    Minutes of Exercise per Session: Not on file   Stress:     Feeling of Stress : Not on file   Social Connections:     Frequency of Communication with Friends and Family: Not on file    Frequency of Social Gatherings with Friends and Family: Not on file    Attends Jain Services: Not on file    Active Member of [address] [place] [address] [employer] or Organizations: Not on file    Attends Club or Organization Meetings: Not on file    Marital Status: Not on file   Intimate Partner Violence:     Fear of Current or Ex-Partner: Not on file    Emotionally Abused: Not on file    Physically Abused: Not on file    Sexually Abused: Not on file   Housing Stability:     Unable to Pay for Housing in the Last Year: Not on file    Number of [place] in the Last Year: Not on file    Unstable Housing in the Last Year: Not on file      Current Outpatient Medications on File Prior to Visit   Medication Sig Dispense Refill    aspirin 81 MG EC tablet Take 81 mg by mouth daily      Multiple Vitamin or induration. Vascular: Examination reveals no swelling or calf tenderness. Peripheral pulses are palpable and 2+. Neurological: The patient has good coordination. There is no weakness or sensory deficit. Skin:  Head/Neck: inspection reveals no rashes, ulcerations or lesions. Trunk: inspection reveals no rashes, ulcerations or lesions. Objective:  Ht 5' 8\" (1.727 m)   Wt 170 lb (77.1 kg)   BMI 25.85 kg/m²      Physical Exam:  The patient is well-appearing and in no apparent distress  Examination of the right and left hips  Range of motion reveals 100 degrees of flexion with internal rotation of 10 degrees and external rotation of 30 to 40 degrees, no pain with internal or external rotation of the hip, negative Flako Kulwinder, negative Stinchfield, negative impingement test  5 out of 5 strength throughout distal muscle groups  Sensation is intact to light touch throughout all distributions  There is no calf swelling or tenderness  Palpable DP pulse, brisk cap refill, 2+ symmetric reflexes    Imaging:  AP of pelvis x-ray as well as AP and lateral x-rays of the left and right hips were obtained in the office today on 6/2/2022. Preserved joint spaces. Subtle irregularity of the femoral head bilaterally concerning for possible early AVN. No evidence of collapse. Assessment:  Suspect bilateral hip AVN    Plan:  I discussed with the patient the differential diagnosis. At this point I would recommend further evaluation with an MRI of both the left and right hips. He is in agreement. Following the study he will call us for the results and neck steps. Greater than 45 minutes were spent with this encounter. Time spent included evaluating the patient's chart prior to arrival.  Evaluating the patient in the office including history, physical examination, imaging reviewing, and counseling on next steps. Lastly, time was spent discussing orders with my staff as well as providing documentation in the chart. Juma Mansfield MD            Orthopaedic Surgery Sports Medicine and [address] and [address] [facility]            Team Physician [facility] ([place]      Disclaimer: This note was dictated with voice recognition software. Though review and correction are routine, we apologize for any errors.

## 2022-06-03 ENCOUNTER — TELEPHONE (OUTPATIENT)
Dept: ORTHOPEDIC SURGERY | Age: 57
End: 2022-06-03

## 2022-06-03 NOTE — TELEPHONE ENCOUNTER
MRI LT-RT HIP (BILATERAL) APPROVED. Dana Galvan # [id number] DATE RANGE 06/02/22-08/31/22. [place] [facility] [place] [facility] INSURANCE IS REQUIRING A FREE-STANDING FACILITY. -JA    SPOKE WITH PT. RELAYED THE ABOVE INFORMATION AND LET HIM KNOW HE MAY F/U WITH [facility] TO SCHEDULE MRI. HE WILL F/U WITH SCHEDULING F/U APPT WITH US ONCE HIS MRI IS SCHEDULED. ALL QUESTIONS ANSWERED. PATIENT EXPRESSED UNDERSTANDING.

## 2022-06-20 ENCOUNTER — TELEPHONE (OUTPATIENT)
Dept: ORTHOPEDIC SURGERY | Age: 57
End: 2022-06-20

## 2022-06-20 DIAGNOSIS — M25.552 LEFT HIP PAIN: Primary | ICD-10-CM

## 2022-06-20 DIAGNOSIS — M25.551 RIGHT HIP PAIN: ICD-10-CM

## 2022-06-20 NOTE — TELEPHONE ENCOUNTER
Spoke with Pt letting him know the following from Dr. Karina Hu     Please call this patient. Shaggy Catherine MRI of the hip demonstrates avascular necrosis.  I would recommend follow-up evaluation with Dr. Willy Sicard.   Please coordinate with his team. Catherine Smyth             A referral ws put in and a staff message was sent to [facility] on his team to reach out to Pt to make an appt

## 2022-06-20 NOTE — TELEPHONE ENCOUNTER
----- Message from Nacho Ko MD sent at 6/20/2022  1:11 PM EDT -----  Please call this patient. The MRI of the hip demonstrates avascular necrosis. I would recommend follow-up evaluation with Dr. Bisi Malloy. Please coordinate with his team.  Thanks  ----- Message -----  From: Shravan Berger Results In  Sent: 6/20/2022  12:47 PM EDT  To:  Nacho Ko MD

## 2022-06-21 ENCOUNTER — TELEPHONE (OUTPATIENT)
Dept: ORTHOPEDIC SURGERY | Age: 57
End: 2022-06-21

## 2022-06-21 NOTE — TELEPHONE ENCOUNTER
----- Message from [place], [place] sent at 6/20/2022  2:02 PM EDT -----  Hello,   Can you please call Pt to make an appt? I will put referral in. Thanks  Edna Camacho  ----- Message -----  From: Kelly Morel MD  Sent: 6/20/2022   1:12 PM EDT  To: Shona Woods MD, [place], [place], #    Please call this patient. The MRI of the hip demonstrates avascular necrosis. I would recommend follow-up evaluation with Dr. Lynette Ross. Please coordinate with his team.  Thanks  ----- Message -----  From: Shravan Berger Results In  Sent: 6/20/2022  12:47 PM EDT  To:  Kelly Morel MD

## 2022-06-21 NOTE — TELEPHONE ENCOUNTER
Called patient to schedule from referral. L/m on v/m to call back to schedule with dr Marine Rosenbaum

## 2022-06-27 ENCOUNTER — OFFICE VISIT (OUTPATIENT)
Dept: ORTHOPEDIC SURGERY | Age: 57
End: 2022-06-27
Payer: COMMERCIAL

## 2022-06-27 VITALS — WEIGHT: 160 LBS | BODY MASS INDEX: 24.25 KG/M2 | HEIGHT: 68 IN

## 2022-06-27 DIAGNOSIS — M16.0 PRIMARY OSTEOARTHRITIS OF BOTH HIPS: ICD-10-CM

## 2022-06-27 DIAGNOSIS — M87.00 AVN (AVASCULAR NECROSIS OF BONE) (HCC): Primary | ICD-10-CM

## 2022-06-27 PROCEDURE — 99215 OFFICE O/P EST HI 40 MIN: CPT | Performed by: ORTHOPAEDIC SURGERY

## 2022-06-27 NOTE — PROGRESS NOTES
Date:  2022    Name:  Bhupinder Vasques  Address:  [facility] [address] [address]    :  1965      Age:   61 y.o.    SSN:  [id number]      Medical Record Number:  [id number]    Reason for Visit:    Chief Complaint    New Patient (NP BILATERAL HIP)      DOS:2022     HPI: Bhupinder Vasques is a 61 y.o. male here today for  evaluation of incident bilateral hip AVN that was identified on workup of a recent RSV pneumonia while on holiday in [place]. This was diagnosed 3 months ago. He was referred to me by my partner Dr Welton Lombard. Pain assessment is documented below. He is actually completed asymptomatic from his hips. The patient denies any bowel/bladder symptoms, or any numbness, tingling, or weakness down the affected thigh or leg. The patient denies any prior hip injuries, surgeries, or any childhood history of hip disorders. With respect to risk factors for AVN, chronic alcohol consumption has been identified. 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 Pain Assessment  Location of Pain: Hip  Location Modifiers: Left,Right  Severity of Pain: 0  Limiting Behavior: No  Result of Injury: No  Work-Related Injury: No  Are there other pain locations you wish to document?: No  ROS: Review of systems reviewed from Patient History Form completed today and available in the patient's chart under the Media tab.        Past Medical History:   Diagnosis Date    ED (erectile dysfunction)     FH: CAD (coronary artery disease)     GERD (gastroesophageal reflux disease)     HLD (hyperlipidemia)     HTN (hypertension)     MTHFR mutation     heterozygous; takes vitamins (B6, B12, folate)        Past Surgical History:   Procedure Laterality Date    SKIN CANCER EXCISION  2015    Melanoma on face    SPINE SURGERY      UVULECTOMY  2015    WISDOM TOOTH EXTRACTION         Family History   Problem Relation Age of Onset    Heart Attack Father  Heart Disease Father     Hypertension Mother    Zabrina Nelson Elevated Lipids Sister     Elevated Lipids Father        Social History     Socioeconomic History    Marital status:      Spouse name: None    Number of children: None    Years of education: None    Highest education level: None   Occupational History    None   Tobacco Use    Smoking status: Former Smoker     Packs/day: 1.00     Years: 30.00     Pack years: 30.00     Types: Cigarettes     Quit date:      Years since quittin.4    Smokeless tobacco: Never Used   Substance and Sexual Activity    Alcohol use: Yes     Alcohol/week: 98.0 standard drinks     Types: 56 Cans of beer, 42 Shots of liquor per week     Comment: Pt previous use of alcohol daily x35 years 6 shots per night along with 8 beers, recently cut down to occasional use    Drug use: Yes     Frequency: 7.0 times per week     Types: Marijuana Anita Dasen)     Comment: daily use-no medical card    Sexual activity: None   Other Topics Concern    None   Social History Narrative    None     Social Determinants of Health     Financial Resource Strain: Low Risk     Difficulty of Paying Living Expenses: Not hard at all   Food Insecurity: No Food Insecurity    Worried About Running Out of Food in the Last Year: Never true    Cody of Food in the Last Year: Never true   Transportation Needs:     Lack of Transportation (Medical): Not on file    Lack of Transportation (Non-Medical):  Not on file   Physical Activity:     Days of Exercise per Week: Not on file    Minutes of Exercise per Session: Not on file   Stress:     Feeling of Stress : Not on file   Social Connections:     Frequency of Communication with Friends and Family: Not on file    Frequency of Social Gatherings with Friends and Family: Not on file    Attends Hinduism Services: Not on file    Active Member of Clubs or Organizations: Not on file    Attends Club or Organization Meetings: Not on file    Marital Status: Not on file   Intimate Partner Violence:     Fear of Current or Ex-Partner: Not on file    Emotionally Abused: Not on file    Physically Abused: Not on file    Sexually Abused: Not on file   Housing Stability:     Unable to Pay for Housing in the Last Year: Not on file    Number of Kalyn in the Last Year: Not on file    Unstable Housing in the Last Year: Not on file       Current Outpatient Medications   Medication Sig Dispense Refill    aspirin 81 MG EC tablet Take 81 mg by mouth daily      Multiple Vitamin (MULTIVITAMIN ADULT PO) Take 1 tablet by mouth daily      ascorbic acid (VITAMIN C) 250 MG tablet Take 250 mg by mouth 3 times daily      calcium carbonate (OS-BRIT) 1250 (500 Ca) MG chewable tablet Take by mouth      vitamin D3 (CHOLECALCIFEROL) 10 MCG (400 UNIT) TABS tablet Take 400 Units by mouth daily      ferrous sulfate (SLOW FE) 142 (45 Fe) MG extended release tablet Take 1 tablet by mouth daily (with breakfast)      ipratropium (ATROVENT HFA) 17 MCG/ACT inhaler Inhale 2 puffs into the lungs      metoprolol succinate (TOPROL XL) 25 MG extended release tablet Take 25 mg by mouth daily      magnesium oxide (MAG-OX) 400 MG tablet Take 400 mg by mouth      potassium chloride (KLOR-CON) 10 MEQ extended release tablet Take 10 mEq by mouth daily      Sodium Sulfate-Mag Sulfate-KCl (SUTAB) 0375-578-413 MG TABS SUTAB 6104-556-174 MG TABS      atorvastatin (LIPITOR) 80 MG tablet 1 at bed for LDL & heart. 90 tablet 3    folic acid-pyridoxine-cyanocobalamine (FOLBEE) 2.5-25-1 MG TABS tablet Take 1 tablet by mouth 2 times daily. 60 tablet 0    tadalafil (CIALIS) 5 MG tablet Take 1 tablet by mouth as needed for Erectile Dysfunction. (Patient taking differently: Take 20 mg by mouth as needed for Erectile Dysfunction ) 30 tablet 0    LORazepam (ATIVAN) 1 MG tablet Take 1 mg by mouth as needed.  omeprazole (PRILOSEC) 20 MG capsule Take 20 mg by mouth daily.       Cyanocobalamin (B-12) [address] [place] Take  by mouth.  L-Methylfolate-B6-B12 (METANX PO) Take  by mouth. No current facility-administered medications for this visit. Allergies   Allergen Reactions    Methimazole Rash       Vital signs:  Ht 5' 8\" (1.727 m)   Wt 160 lb (72.6 kg)   BMI 24.33 kg/m²        Constitutional: The physical examination finds the patient to be well-developed and well-nourished. The patient is alert and oriented x3 and was cooperative throughout the visit. Neuro: no focal deficits noted. Normal mood, judgement, decision making  Eyes: sclera clear, EOMI  Ears: Normal external ear  Mouth:  No perioral lesions  Pulm: Respirations unlabored and regular  Pulse: Extremities well perfused, warm, capillary refill < 2 seconds  Musculoskeletal:    Hip Examination: bilateral    Skin/Inspection: No skin lesions, cellulitis, or extreme edema in the lower extremities. Standing/Walking: normal gait, negative Trendelenburg sign. Supine/Side Lying Exam: Non tender around the major bony prominences  full range of motion  FADIR Negative  TOMASZ Negative  Resisted Abduction 5/5   Resisted Adduction 5/5   Resisted  Flexion 5/5   not tender at greater trochanter    Distal Neurovascular exam is intact (foot sensation, pulses, and motor exam)         Diagnostics:  Radiology:       Pertinent imaging reviewed, images only - no report available. newRadiographs were obtained and reviewed in the office; 3 views: AP Pelvis and bilateral hip 45-deg Oliveros View, and bilateral False Profile View    Tonnis Grade: grade 0   LCEA: 30  deg without DJD   Alpha Angle: 55deg without DJD   Cross over-sign is negative  Other:  Negative Coxa Profunda, Negative Protrusio  Impression: no acute findings regarding any fractures, loose bodies, or dislocation. There are bilateral cresecent signs, pre collapse.      Assessment: Patient is a 61 y.o. male with bilateral hip AVN that have been identified on work-up of an RSV pneumonia on a CT of the hips. These are completely asymptomatic. As a risk factors for his AVN, chronic alcohol consumption has been identified. Impression:  Visit Diagnoses       Codes    AVN (avascular necrosis of bone) ([facility] [address]    -  Primary M87.00    Primary osteoarthritis of both hips     M16.0          Office Procedures:  No orders of the defined types were placed in this encounter. No orders of the defined types were placed in this encounter. Plan:  Pertinent imaging was reviewed. The etiology, natural history, and treatment options for the disorder were discussed. The roles of activity modification, antiinflammatory medicine, injections, bracing, physical therapy, and surgical interventions were all described to the patient and questions were answered. We believe patient is a candidate for non operative management, and radiologic surveillance every 6 months. In the event of any pain or symptoms that are worsening, we would like to see him sooner. All the patient's questions were answered while in the clinic. The patient is understanding of all instructions and agrees with the plan. Approximately 45 minutes were spent on patient education and coordinating care. This visit represents high complexity given the medical decision making as well as independent interpretation of exam results and high risk given the management of chronic illnesses and potential for an elective major procedure with identifiable medical risk factors       Follow up in: Return in about 6 months (around 12/27/2022). + new ap pelvis and bilateral diaz view hip xrays    Hip Self assessment forms      Sincerely,    Marianna Contreras MD [address] [address]  Email: [email or web address]  Office: [phone]      06/27/22  1:24 PM    This dictation was performed with a verbal recognition program [facility]) and it was checked for errors. It is possible that there are still dictated errors within this office note. If so, please bring any errors to my attention for an addendum. All efforts were made to ensure that this office note is accurate.

## 2022-06-27 NOTE — LETTER
[facility]  [address]  [address]  Phone: [phone]  Fax: [phone]           Mario Mares MD      June 27, 2022     Patient: Karishma Bingham   MR Number: [id number]   YOB: 1965   Date of Visit: 6/27/2022       Dear Dr. Karl Rubin:    Thank you for referring Karishma Bingham to me for evaluation/treatment. Below are the relevant portions of my assessment and plan of care. If you have questions, please do not hesitate to call me. I look forward to following Darrel Cohen along with you.     Sincerely,        Mario Mares MD    CC providers:  Eddy Prader, MD  [address]  [address] [facility] [address]  Via In

## 2022-07-25 RX ORDER — METOPROLOL SUCCINATE 25 MG/1
25 TABLET, EXTENDED RELEASE ORAL DAILY
Qty: 30 TABLET | Refills: 1 | Status: SHIPPED | OUTPATIENT
Start: 2022-07-25 | End: 2022-09-21

## 2022-07-25 NOTE — TELEPHONE ENCOUNTER
Medication:   Requested Prescriptions     Pending Prescriptions Disp Refills    metoprolol succinate (TOPROL XL) 25 MG extended release tablet 30 tablet      Sig: Take 2 tablets by mouth in the morning. Last Filled:  3/15/2022, no refills - pt sts the ED increased his dose to 2 QD    Patient Phone Number: [phone] (home) [phone] (work)    Last appt: 5/26/2022   Next appt: 9/1/2022    Last OARRS: No flowsheet data found.

## 2022-07-25 NOTE — TELEPHONE ENCOUNTER
Pt called requesting refill sent to [address] on Metropolol succinate 25 mg extended release tablet.  STATES HE IS TAKING PER DAY

## 2022-07-26 NOTE — TELEPHONE ENCOUNTER
Patient states the change was made at the hospital in [place], [address] [facility] records request had been sent to them but we have not received anything back yet. Patient has been taking the BID does since March.

## 2022-09-01 ENCOUNTER — OFFICE VISIT (OUTPATIENT)
Dept: PRIMARY CARE CLINIC | Age: 57
End: 2022-09-01
Payer: COMMERCIAL

## 2022-09-01 VITALS
RESPIRATION RATE: 16 BRPM | TEMPERATURE: 97.3 F | SYSTOLIC BLOOD PRESSURE: 136 MMHG | HEART RATE: 68 BPM | OXYGEN SATURATION: 96 % | DIASTOLIC BLOOD PRESSURE: 84 MMHG | WEIGHT: 166 LBS | BODY MASS INDEX: 25.16 KG/M2 | HEIGHT: 68 IN

## 2022-09-01 DIAGNOSIS — F10.10 ALCOHOL ABUSE: ICD-10-CM

## 2022-09-01 DIAGNOSIS — I10 PRIMARY HYPERTENSION: Primary | ICD-10-CM

## 2022-09-01 DIAGNOSIS — R79.89 LOW TESTOSTERONE IN MALE: ICD-10-CM

## 2022-09-01 DIAGNOSIS — E78.5 DYSLIPIDEMIA: ICD-10-CM

## 2022-09-01 DIAGNOSIS — M87.052 AVASCULAR NECROSIS OF BONES OF BOTH HIPS (HCC): ICD-10-CM

## 2022-09-01 DIAGNOSIS — K21.9 GASTROESOPHAGEAL REFLUX DISEASE WITHOUT ESOPHAGITIS: ICD-10-CM

## 2022-09-01 DIAGNOSIS — M87.051 AVASCULAR NECROSIS OF BONES OF BOTH HIPS (HCC): ICD-10-CM

## 2022-09-01 PROCEDURE — 99214 OFFICE O/P EST MOD 30 MIN: CPT | Performed by: FAMILY MEDICINE

## 2022-09-01 ASSESSMENT — ENCOUNTER SYMPTOMS
BLOOD IN STOOL: 0
VOMITING: 0
NAUSEA: 0
COUGH: 0
SHORTNESS OF BREATH: 0
SORE THROAT: 0
ABDOMINAL PAIN: 0

## 2022-09-01 NOTE — PROGRESS NOTES
Chief Complaint   Patient presents with    Hypertension     Pt here for follow up visit - no new concerns         Jonny Mora is a 61 y.o. male who presents for followup visit regarding HTN, dyslipidemia, GERD and alcohol abuse. Pt did see Ortho regarding his hip avascular necrosis and had MRI hips done and plan is for radiologic surveillance as pt is w/o any pain     Patient did recent bloodwork [CBC, CMP, CK, lipase, TSH, testosterone] which was unremarkable except for a low testosterone level 186 and will thus refer patient to Urology for evaluation and treatment. Patient also is with an elevated WBC 12.4 (with neutrophils 9.1) along with a mild normocytic anemia with HgB 12.5 which is most likely secondary to his recent sepsis and will repeat CBC bloodwork at his next office visit ---- Pt did see Urology and was started on testosterone gel daily w/o improvement and is now on clomid medication every other day     Pt has a hx of HTN and dyslipidemia and is on lopressor 25 mg daily medication and a Baby ASA     Pt denies any hx of CAD, kidney disease or diabetes     Pt is on prilosec for GERD and has a hx of chronic anemia and is followed by Hematology     Pt had a colonoscopy 12/30/2021 which was normal except for several benign polyps and mild diverticulosis     Pt has 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     Pt has a hx of basal cell cancer nose and uvula s/p excision 2015 and did not require any chemo or radiation and pt quit smoking at that time and is followed by Dermatology     Surgical hx includes L5 - S1 lumbar disc surgery 20 years ago     Pt did complete his COVID vaccine series     Pt is an exsmoker 1 PPD x 30 years and was using alcohol 8 - 10 beers and 2 shots daily for 30 years and stayed sober for 1 month s/p hospitalization but has resumed drinking 2 beers 4 times a week;  Pt smokes marijuana daily     FHx positive for alcoholism and early CAD(father) and glaucoma (mother)       Review of Systems   Constitutional:  Negative for fatigue and fever. HENT:  Negative for congestion, nosebleeds and sore throat. Eyes:         Positive blindness in L eye   Respiratory:  Negative for cough and shortness of breath. Cardiovascular:  Negative for chest pain, palpitations and leg swelling. Gastrointestinal:  Negative for abdominal pain, blood in stool, nausea and vomiting. Negative melena or indigestion   Endocrine: Negative for polydipsia and polyuria. Genitourinary:  Negative for dysuria and hematuria. Musculoskeletal:  Positive for neck pain (at times from DDD and is scheduled to see Neurosurgery next week). Negative for arthralgias. Skin:  Negative for rash. Neurological:  Negative for dizziness, seizures, syncope, speech difficulty, weakness and headaches. Psychiatric/Behavioral:  Negative for dysphoric mood. The patient is not nervous/anxious. Vitals:    09/01/22 0738   BP: 136/84   Pulse: 68   Resp: 16   Temp: 97.3 °F (36.3 °C)   SpO2: 96%         Physical Exam  Vitals reviewed. Constitutional:       General: He is not in acute distress. HENT:      Mouth/Throat:      Mouth: Mucous membranes are moist.      Pharynx: Oropharynx is clear. Eyes:      General: No scleral icterus. Comments: Pink conjunctivae    Neck:      Thyroid: No thyromegaly. Comments: No carotid bruits noted B/L  Cardiovascular:      Heart sounds: Normal heart sounds. No murmur heard. No friction rub. No gallop. Pulmonary:      Effort: Pulmonary effort is normal.      Breath sounds: Normal breath sounds. Abdominal:      Palpations: Abdomen is soft. Tenderness: There is no abdominal tenderness. Musculoskeletal:      Comments: No leg edema noted B/L   Lymphadenopathy:      Cervical: No cervical adenopathy. Skin:     Findings: No rash. Neurological:      Mental Status: He is alert.       Comments: Cranial nerves 2 - 12 grossly intact; Muscle strength 5/5 throughout   Psychiatric:         Mood and Affect: Mood normal.       ASSESSMENT AND PLAN    1. Dyslipidemia  Patient clinically stable on present medications and is with a nonfocal neuro exam    2. Alcohol abuse  Pt was told to limit his intake to at most 6 drinks weekly    3. Primary hypertension  Patient clinically stable on present medications and denies any CP or SOB     4. Low testosterone in male  Pt is being managed by Urology    5. Gastroesophageal reflux disease without esophagitis  Patient clinically stable on present medications and is with a nontender abdomen on PE    6. Avascular necrosis of bones of both hips ([address]  Patient was seen by Ortho and plan is for radiologic surveillance given that pt is w/o any pain      Elmo Marquez MD      Return in about 3 months (around 12/1/2022). Patient Instructions     Go to the ER ASAP if you develop any chest pain, shortness of breath, facial droop, slurred speech, weakness/numbness in your arms or legs or double vision!

## 2022-09-21 RX ORDER — METOPROLOL SUCCINATE 25 MG/1
25 TABLET, EXTENDED RELEASE ORAL DAILY
Qty: 30 TABLET | Refills: 2 | Status: SHIPPED | OUTPATIENT
Start: 2022-09-21

## 2022-09-21 NOTE — TELEPHONE ENCOUNTER
Medication:   Requested Prescriptions     Pending Prescriptions Disp Refills    metoprolol succinate (TOPROL XL) 25 MG extended release tablet [Pharmacy Med Name: METOPROLOL ER SUCCINATE 25MG TABS] 30 tablet 1     Sig: TAKE 1 TABLET BY MOUTH IN THE MORNING       Last Filled:  07/25/2022 #30 with one refills     Patient Phone Number: [phone] (home) [phone] (work)    Last appt: 9/1/2022   Next appt: 12/5/2022    Last Labs DM: No results found for: LABA1C  Last Lipid:   Lab Results   Component Value Date/Time    CHOL 168 06/07/2021 12:00 AM    TRIG 156 06/07/2021 12:00 AM    HDL 69 06/07/2021 12:00 AM    LDLCALC 99 06/07/2021 12:00 AM     Last PSA: No results found for: PSA  Last Thyroid: No results found for: TSH, FT3, U8GCMNU, T4FREE, W3NEHTY

## 2022-10-06 ENCOUNTER — APPOINTMENT (OUTPATIENT)
Dept: GENERAL RADIOLOGY | Age: 57
DRG: 866 | End: 2022-10-06
Payer: COMMERCIAL

## 2022-10-06 ENCOUNTER — HOSPITAL ENCOUNTER (INPATIENT)
Age: 57
LOS: 1 days | Discharge: HOME | DRG: 866 | End: 2022-10-07
Attending: EMERGENCY MEDICINE | Admitting: HOSPITALIST
Payer: COMMERCIAL

## 2022-10-06 DIAGNOSIS — N17.9 AKI (ACUTE KIDNEY INJURY) (HCC): ICD-10-CM

## 2022-10-06 DIAGNOSIS — R19.7 DIARRHEA, UNSPECIFIED TYPE: ICD-10-CM

## 2022-10-06 DIAGNOSIS — J06.9 ACUTE UPPER RESPIRATORY INFECTION: ICD-10-CM

## 2022-10-06 DIAGNOSIS — E83.42 HYPOMAGNESEMIA: Primary | ICD-10-CM

## 2022-10-06 LAB
A/G RATIO: 2 (ref 1.1–2.2)
ALBUMIN SERPL-MCNC: 4.9 G/DL (ref 3.4–5)
ALP BLD-CCNC: 66 U/L (ref 40–129)
ALT SERPL-CCNC: 25 U/L (ref 10–40)
ANION GAP SERPL CALCULATED.3IONS-SCNC: 15 MMOL/L (ref 3–16)
AST SERPL-CCNC: 29 U/L (ref 15–37)
BACTERIA: ABNORMAL /HPF
BASOPHILS ABSOLUTE: 0.1 K/UL (ref 0–0.2)
BASOPHILS RELATIVE PERCENT: 0.9 %
BILIRUB SERPL-MCNC: 1.1 MG/DL (ref 0–1)
BILIRUBIN URINE: ABNORMAL
BLOOD, URINE: NEGATIVE
BUN BLDV-MCNC: 26 MG/DL (ref 7–20)
CALCIUM SERPL-MCNC: 9.4 MG/DL (ref 8.3–10.6)
CHLORIDE BLD-SCNC: 101 MMOL/L (ref 99–110)
CLARITY: CLEAR
CO2: 17 MMOL/L (ref 21–32)
COLOR: ABNORMAL
CREAT SERPL-MCNC: 1.5 MG/DL (ref 0.9–1.3)
EOSINOPHILS ABSOLUTE: 0.1 K/UL (ref 0–0.6)
EOSINOPHILS RELATIVE PERCENT: 0.4 %
EPITHELIAL CELLS, UA: 2 /HPF (ref 0–5)
GFR AFRICAN AMERICAN: 58
GFR NON-AFRICAN AMERICAN: 48
GLUCOSE BLD-MCNC: 171 MG/DL (ref 70–99)
GLUCOSE URINE: NEGATIVE MG/DL
HCT VFR BLD CALC: 41 % (ref 40.5–52.5)
HEMOGLOBIN: 14.4 G/DL (ref 13.5–17.5)
HYALINE CASTS: 13 /LPF (ref 0–8)
HYALINE CASTS: PRESENT
KETONES, URINE: 15 MG/DL
LEUKOCYTE ESTERASE, URINE: NEGATIVE
LIPASE: 45 U/L (ref 13–60)
LYMPHOCYTES ABSOLUTE: 1.4 K/UL (ref 1–5.1)
LYMPHOCYTES RELATIVE PERCENT: 10.3 %
MAGNESIUM: 1 MG/DL (ref 1.8–2.4)
MAGNESIUM: 1.6 MG/DL (ref 1.8–2.4)
MCH RBC QN AUTO: 31.9 PG (ref 26–34)
MCHC RBC AUTO-ENTMCNC: 35.2 G/DL (ref 31–36)
MCV RBC AUTO: 90.6 FL (ref 80–100)
MICROSCOPIC EXAMINATION: YES
MONOCYTES ABSOLUTE: 1 K/UL (ref 0–1.3)
MONOCYTES RELATIVE PERCENT: 7.3 %
NEUTROPHILS ABSOLUTE: 11.1 K/UL (ref 1.7–7.7)
NEUTROPHILS RELATIVE PERCENT: 81.1 %
NITRITE, URINE: NEGATIVE
PDW BLD-RTO: 13 % (ref 12.4–15.4)
PH UA: 5.5 (ref 5–8)
PLATELET # BLD: 248 K/UL (ref 135–450)
PMV BLD AUTO: 9.1 FL (ref 5–10.5)
POTASSIUM SERPL-SCNC: 3.6 MMOL/L (ref 3.5–5.1)
PRO-BNP: 122 PG/ML (ref 0–124)
PROTEIN UA: 30 MG/DL
RAPID INFLUENZA  B AGN: NEGATIVE
RAPID INFLUENZA A AGN: NEGATIVE
RBC # BLD: 4.52 M/UL (ref 4.2–5.9)
RBC UA: 1 /HPF (ref 0–4)
RSV RAPID ANTIGEN: NEGATIVE
SARS-COV-2, NAAT: NOT DETECTED
SODIUM BLD-SCNC: 133 MMOL/L (ref 136–145)
SPECIFIC GRAVITY UA: >=1.03 (ref 1–1.03)
TOTAL PROTEIN: 7.3 G/DL (ref 6.4–8.2)
TROPONIN: <0.01 NG/ML
URINE REFLEX TO CULTURE: ABNORMAL
URINE TYPE: ABNORMAL
UROBILINOGEN, URINE: 1 E.U./DL
WBC # BLD: 13.7 K/UL (ref 4–11)
WBC UA: 2 /HPF (ref 0–5)

## 2022-10-06 PROCEDURE — 80053 COMPREHEN METABOLIC PANEL: CPT

## 2022-10-06 PROCEDURE — 87804 INFLUENZA ASSAY W/OPTIC: CPT

## 2022-10-06 PROCEDURE — 83880 ASSAY OF NATRIURETIC PEPTIDE: CPT

## 2022-10-06 PROCEDURE — 81001 URINALYSIS AUTO W/SCOPE: CPT

## 2022-10-06 PROCEDURE — 96375 TX/PRO/DX INJ NEW DRUG ADDON: CPT

## 2022-10-06 PROCEDURE — 36415 COLL VENOUS BLD VENIPUNCTURE: CPT

## 2022-10-06 PROCEDURE — 94760 N-INVAS EAR/PLS OXIMETRY 1: CPT

## 2022-10-06 PROCEDURE — 87635 SARS-COV-2 COVID-19 AMP PRB: CPT

## 2022-10-06 PROCEDURE — 96365 THER/PROPH/DIAG IV INF INIT: CPT

## 2022-10-06 PROCEDURE — 84484 ASSAY OF TROPONIN QUANT: CPT

## 2022-10-06 PROCEDURE — 93005 ELECTROCARDIOGRAM TRACING: CPT | Performed by: PHYSICIAN ASSISTANT

## 2022-10-06 PROCEDURE — 6360000002 HC RX W HCPCS: Performed by: PHYSICIAN ASSISTANT

## 2022-10-06 PROCEDURE — 1200000000 HC SEMI PRIVATE

## 2022-10-06 PROCEDURE — 2580000003 HC RX 258: Performed by: HOSPITALIST

## 2022-10-06 PROCEDURE — 96372 THER/PROPH/DIAG INJ SC/IM: CPT

## 2022-10-06 PROCEDURE — 99285 EMERGENCY DEPT VISIT HI MDM: CPT

## 2022-10-06 PROCEDURE — 71045 X-RAY EXAM CHEST 1 VIEW: CPT

## 2022-10-06 PROCEDURE — 85025 COMPLETE CBC W/AUTO DIFF WBC: CPT

## 2022-10-06 PROCEDURE — G0378 HOSPITAL OBSERVATION PER HR: HCPCS

## 2022-10-06 PROCEDURE — 83690 ASSAY OF LIPASE: CPT

## 2022-10-06 PROCEDURE — 96366 THER/PROPH/DIAG IV INF ADDON: CPT

## 2022-10-06 PROCEDURE — 96374 THER/PROPH/DIAG INJ IV PUSH: CPT

## 2022-10-06 PROCEDURE — 87807 RSV ASSAY W/OPTIC: CPT

## 2022-10-06 PROCEDURE — 6360000002 HC RX W HCPCS: Performed by: HOSPITALIST

## 2022-10-06 PROCEDURE — 83735 ASSAY OF MAGNESIUM: CPT

## 2022-10-06 PROCEDURE — 96361 HYDRATE IV INFUSION ADD-ON: CPT

## 2022-10-06 PROCEDURE — 2580000003 HC RX 258: Performed by: PHYSICIAN ASSISTANT

## 2022-10-06 RX ORDER — ONDANSETRON 4 MG/1
4 TABLET, ORALLY DISINTEGRATING ORAL EVERY 8 HOURS PRN
Status: DISCONTINUED | OUTPATIENT
Start: 2022-10-06 | End: 2022-10-07 | Stop reason: HOSPADM

## 2022-10-06 RX ORDER — SODIUM CHLORIDE 9 MG/ML
INJECTION, SOLUTION INTRAVENOUS CONTINUOUS
Status: DISCONTINUED | OUTPATIENT
Start: 2022-10-06 | End: 2022-10-07 | Stop reason: HOSPADM

## 2022-10-06 RX ORDER — ACETAMINOPHEN 650 MG/1
650 SUPPOSITORY RECTAL EVERY 6 HOURS PRN
Status: DISCONTINUED | OUTPATIENT
Start: 2022-10-06 | End: 2022-10-07 | Stop reason: HOSPADM

## 2022-10-06 RX ORDER — SODIUM CHLORIDE 0.9 % (FLUSH) 0.9 %
5-40 SYRINGE (ML) INJECTION PRN
Status: DISCONTINUED | OUTPATIENT
Start: 2022-10-06 | End: 2022-10-07 | Stop reason: HOSPADM

## 2022-10-06 RX ORDER — ACETAMINOPHEN 325 MG/1
650 TABLET ORAL EVERY 6 HOURS PRN
Status: DISCONTINUED | OUTPATIENT
Start: 2022-10-06 | End: 2022-10-07 | Stop reason: HOSPADM

## 2022-10-06 RX ORDER — MAGNESIUM SULFATE IN WATER 40 MG/ML
2000 INJECTION, SOLUTION INTRAVENOUS ONCE
Status: COMPLETED | OUTPATIENT
Start: 2022-10-06 | End: 2022-10-06

## 2022-10-06 RX ORDER — SODIUM CHLORIDE, SODIUM LACTATE, POTASSIUM CHLORIDE, CALCIUM CHLORIDE 600; 310; 30; 20 MG/100ML; MG/100ML; MG/100ML; MG/100ML
1000 INJECTION, SOLUTION INTRAVENOUS ONCE
Status: COMPLETED | OUTPATIENT
Start: 2022-10-06 | End: 2022-10-06

## 2022-10-06 RX ORDER — VITAMIN B COMPLEX
1 CAPSULE ORAL DAILY
COMMUNITY

## 2022-10-06 RX ORDER — POTASSIUM CHLORIDE 7.45 MG/ML
10 INJECTION INTRAVENOUS PRN
Status: DISCONTINUED | OUTPATIENT
Start: 2022-10-06 | End: 2022-10-07 | Stop reason: HOSPADM

## 2022-10-06 RX ORDER — MAGNESIUM SULFATE 1 G/100ML
1000 INJECTION INTRAVENOUS PRN
Status: DISCONTINUED | OUTPATIENT
Start: 2022-10-06 | End: 2022-10-07 | Stop reason: HOSPADM

## 2022-10-06 RX ORDER — LANOLIN ALCOHOL/MO/W.PET/CERES
3 CREAM (GRAM) TOPICAL NIGHTLY PRN
Status: DISCONTINUED | OUTPATIENT
Start: 2022-10-06 | End: 2022-10-07 | Stop reason: HOSPADM

## 2022-10-06 RX ORDER — SODIUM CHLORIDE 9 MG/ML
INJECTION, SOLUTION INTRAVENOUS PRN
Status: DISCONTINUED | OUTPATIENT
Start: 2022-10-06 | End: 2022-10-07 | Stop reason: HOSPADM

## 2022-10-06 RX ORDER — ASPIRIN 81 MG/1
81 TABLET ORAL DAILY
Status: DISCONTINUED | OUTPATIENT
Start: 2022-10-07 | End: 2022-10-07 | Stop reason: HOSPADM

## 2022-10-06 RX ORDER — ATORVASTATIN CALCIUM 10 MG/1
10 TABLET, FILM COATED ORAL DAILY
Status: DISCONTINUED | OUTPATIENT
Start: 2022-10-07 | End: 2022-10-07 | Stop reason: HOSPADM

## 2022-10-06 RX ORDER — SODIUM CHLORIDE 0.9 % (FLUSH) 0.9 %
5-40 SYRINGE (ML) INJECTION EVERY 12 HOURS SCHEDULED
Status: DISCONTINUED | OUTPATIENT
Start: 2022-10-06 | End: 2022-10-07 | Stop reason: HOSPADM

## 2022-10-06 RX ORDER — POLYETHYLENE GLYCOL 3350 17 G/17G
17 POWDER, FOR SOLUTION ORAL DAILY PRN
Status: DISCONTINUED | OUTPATIENT
Start: 2022-10-06 | End: 2022-10-07 | Stop reason: HOSPADM

## 2022-10-06 RX ORDER — ONDANSETRON 2 MG/ML
4 INJECTION INTRAMUSCULAR; INTRAVENOUS EVERY 6 HOURS PRN
Status: DISCONTINUED | OUTPATIENT
Start: 2022-10-06 | End: 2022-10-07 | Stop reason: HOSPADM

## 2022-10-06 RX ORDER — MORPHINE SULFATE 2 MG/ML
2 INJECTION, SOLUTION INTRAMUSCULAR; INTRAVENOUS EVERY 4 HOURS PRN
Status: DISCONTINUED | OUTPATIENT
Start: 2022-10-06 | End: 2022-10-07 | Stop reason: HOSPADM

## 2022-10-06 RX ORDER — KETOROLAC TROMETHAMINE 30 MG/ML
15 INJECTION, SOLUTION INTRAMUSCULAR; INTRAVENOUS ONCE
Status: COMPLETED | OUTPATIENT
Start: 2022-10-06 | End: 2022-10-06

## 2022-10-06 RX ORDER — ENOXAPARIN SODIUM 100 MG/ML
40 INJECTION SUBCUTANEOUS NIGHTLY
Status: DISCONTINUED | OUTPATIENT
Start: 2022-10-06 | End: 2022-10-07 | Stop reason: HOSPADM

## 2022-10-06 RX ORDER — POTASSIUM CHLORIDE 20 MEQ/1
40 TABLET, EXTENDED RELEASE ORAL PRN
Status: DISCONTINUED | OUTPATIENT
Start: 2022-10-06 | End: 2022-10-07 | Stop reason: HOSPADM

## 2022-10-06 RX ORDER — LANOLIN ALCOHOL/MO/W.PET/CERES
400 CREAM (GRAM) TOPICAL DAILY
Status: DISCONTINUED | OUTPATIENT
Start: 2022-10-06 | End: 2022-10-07 | Stop reason: HOSPADM

## 2022-10-06 RX ADMIN — MAGNESIUM SULFATE HEPTAHYDRATE 1000 MG: 1 INJECTION, SOLUTION INTRAVENOUS at 18:48

## 2022-10-06 RX ADMIN — SODIUM CHLORIDE: 9 INJECTION, SOLUTION INTRAVENOUS at 18:21

## 2022-10-06 RX ADMIN — KETOROLAC TROMETHAMINE 15 MG: 30 INJECTION, SOLUTION INTRAMUSCULAR at 11:28

## 2022-10-06 RX ADMIN — SODIUM CHLORIDE, POTASSIUM CHLORIDE, SODIUM LACTATE AND CALCIUM CHLORIDE 1000 ML: 600; 310; 30; 20 INJECTION, SOLUTION INTRAVENOUS at 13:09

## 2022-10-06 RX ADMIN — Medication 10 ML: at 19:54

## 2022-10-06 RX ADMIN — ENOXAPARIN SODIUM 40 MG: 100 INJECTION SUBCUTANEOUS at 19:53

## 2022-10-06 RX ADMIN — MAGNESIUM SULFATE HEPTAHYDRATE 2000 MG: 40 INJECTION, SOLUTION INTRAVENOUS at 14:26

## 2022-10-06 RX ADMIN — MAGNESIUM SULFATE HEPTAHYDRATE 1000 MG: 1 INJECTION, SOLUTION INTRAVENOUS at 19:52

## 2022-10-06 ASSESSMENT — ENCOUNTER SYMPTOMS
SORE THROAT: 0
NAUSEA: 0
BACK PAIN: 0
TROUBLE SWALLOWING: 0
COUGH: 1
DIARRHEA: 1
RHINORRHEA: 1
VOICE CHANGE: 0
EYES NEGATIVE: 1
COLOR CHANGE: 0
CONSTIPATION: 0
SHORTNESS OF BREATH: 0
VOMITING: 0
STRIDOR: 0
WHEEZING: 0
ABDOMINAL PAIN: 0

## 2022-10-06 ASSESSMENT — PAIN - FUNCTIONAL ASSESSMENT: PAIN_FUNCTIONAL_ASSESSMENT: NONE - DENIES PAIN

## 2022-10-06 NOTE — ED PROVIDER NOTES
[facility]        Pt Name: Rosa Knight  MRN: [id number]  [place] 1965  Date of evaluation: 10/6/2022  Provider: Adelina Brown PA-C  PCP: Evangelista Mcgee MD  Note Started: 11:28 AM EDT        I have seen and evaluated this patient with my supervising physician Pham Murry MD.    [address] [place] [address]       Chief Complaint   Patient presents with    Cough     Productive cough, diarrhea, body cramps x 3 weeks. Pt has tried mucinex and dayquil with no relief. HX of admission for rsv March 2022. Denies NV       HISTORY OF PRESENT ILLNESS   (Location, Timing/Onset, Context/Setting, Quality, Duration, Modifying Factors, Severity, Associated Signs and Symptoms)  Note limiting factors. Chief Complaint: Body aches, diarrhea, cough    Rosa Knight is a 61 y.o. male who presents to the emergency department stating that for a little over 1 week, patient has had cough, runny nose, congestion, diarrhea and generalized body aches. He has taken DayQuil and Mucinex DM without any significant relief. He feels dehydrated from the watery diarrhea. He denies bloody or black stool. Denies nausea, vomiting or abdominal pain. He has no chest pain or shortness of breath. Denies hemoptysis or palpitations. He has history of RSV in March 2022 with similar presentation. In September, patient received his flu and COVID shot. Nursing Notes were all reviewed and agreed with or any disagreements were addressed in the HPI. REVIEW OF SYSTEMS    (2-9 systems for level 4, 10 or more for level 5)     Review of Systems   Constitutional:  Positive for fatigue. Negative for chills and fever. HENT:  Positive for congestion, postnasal drip and rhinorrhea. Negative for dental problem, drooling, ear discharge, ear pain, sore throat, tinnitus, trouble swallowing and voice change. Eyes: Negative. Respiratory:  Positive for cough.  Negative for shortness of breath, wheezing and stridor. Cardiovascular:  Negative for chest pain, palpitations and leg swelling. Gastrointestinal:  Positive for diarrhea. Negative for abdominal pain, constipation, nausea and vomiting. Genitourinary: Negative. Musculoskeletal:  Positive for myalgias. Negative for back pain, neck pain and neck stiffness. Skin:  Negative for color change, pallor, rash and wound. Neurological: Negative. Psychiatric/Behavioral:  Negative for confusion. All other systems reviewed and are negative. Positives and Pertinent negatives as per HPI. Except as noted above in the ROS, all other systems were reviewed and negative. PAST MEDICAL HISTORY     Past Medical History:   Diagnosis Date    ED (erectile dysfunction)     FH: CAD (coronary artery disease)     GERD (gastroesophageal reflux disease)     HLD (hyperlipidemia)     HTN (hypertension)     MTHFR mutation     heterozygous; takes vitamins (B6, B12, folate)         SURGICAL HISTORY     Past Surgical History:   Procedure Laterality Date    SKIN CANCER EXCISION  02/2015    Melanoma on face    SPINE SURGERY      UVULECTOMY  2015    WISDOM TOOTH EXTRACTION           CURRENTMEDICATIONS       Previous Medications    ASCORBIC ACID (VITAMIN C) 250 MG TABLET    Take 250 mg by mouth 3 times daily    ASPIRIN 81 MG EC TABLET    Take 81 mg by mouth daily    ATORVASTATIN (LIPITOR) 80 MG TABLET    1 at bed for LDL & heart. CYANOCOBALAMIN (B-12) 1000 MCG CAPS    Take  by mouth. METOPROLOL SUCCINATE (TOPROL XL) 25 MG EXTENDED RELEASE TABLET    TAKE 1 TABLET BY MOUTH IN THE MORNING    MULTIPLE VITAMIN (MULTIVITAMIN ADULT PO)    Take 1 tablet by mouth daily    OMEPRAZOLE (PRILOSEC) 20 MG DELAYED RELEASE CAPSULE    Take 20 mg by mouth daily. TADALAFIL (CIALIS) 5 MG TABLET    Take 1 tablet by mouth as needed for Erectile Dysfunction.     VITAMIN D3 (CHOLECALCIFEROL) 10 MCG (400 UNIT) TABS TABLET    Take 400 Units by mouth daily ALLERGIES     Methimazole    FAMILYHISTORY       Family History   Problem Relation Age of Onset    Heart Attack Father     Heart Disease Father     Hypertension Mother     Elevated Lipids Sister     Elevated Lipids Father           SOCIAL HISTORY       Social History     Tobacco Use    Smoking status: Former     Packs/day: 1.00     Years: 30.00     Pack years: 30.00     Types: Cigarettes     Quit date:      Years since quittin.7    Smokeless tobacco: Never   Substance Use Topics    Alcohol use: Yes     Alcohol/week: 98.0 standard drinks     Types: 56 Cans of beer, 42 Shots of liquor per week     Comment: Pt previous use of alcohol daily x35 years 6 shots per night along with 8 beers, recently cut down to occasional use    Drug use: Yes     Frequency: 7.0 times per week     Types: Marijuana [place])     Comment: daily use-no medical card       SCREENINGS             PHYSICAL EXAM    (up to 7 for level 4, 8 or more for level 5)     ED Triage Vitals [10/06/22 1054]   BP Temp Temp Source Heart Rate Resp SpO2 Height Weight   91/63 97.8 °F (36.6 °C) Oral (!) 104 16 96 % 5' 8\" (1.727 m) 165 lb (74.8 kg)       Physical Exam  Vitals and nursing note reviewed. Constitutional:       Appearance: Normal appearance. He is well-developed. He is not toxic-appearing or diaphoretic. HENT:      Head: Normocephalic and atraumatic. Right Ear: External ear normal.      Left Ear: External ear normal.      Nose: Nose normal.      Mouth/Throat:      Mouth: Mucous membranes are moist.      Pharynx: Oropharynx is clear. Eyes:      General: No scleral icterus. Right eye: No discharge. Left eye: No discharge. Extraocular Movements: Extraocular movements intact. Conjunctiva/sclera: Conjunctivae normal.      Pupils: Pupils are equal, round, and reactive to light. Neck:      Trachea: Trachea and phonation normal.      Meningeal: Brudzinski's sign and Kernig's sign absent.    Cardiovascular: Rate and Rhythm: Tachycardia present. Pulmonary:      Effort: Pulmonary effort is normal.      Breath sounds: Normal breath sounds. Abdominal:      General: Bowel sounds are normal.      Palpations: Abdomen is soft. Tenderness: There is no abdominal tenderness. There is no right CVA tenderness or left CVA tenderness. Musculoskeletal:         General: Normal range of motion. Cervical back: Full passive range of motion without pain, normal range of motion and neck supple. Comments: No extremity edema, posterior calf or thigh tenderness, palpable cord, discoloration. Negative homans. Lymphadenopathy:      Cervical: No cervical adenopathy. Skin:     General: Skin is warm and dry. Capillary Refill: Capillary refill takes less than 2 seconds. Coloration: Skin is not jaundiced or pale. Findings: No bruising, erythema, lesion or rash. Neurological:      General: No focal deficit present. Mental Status: He is alert and oriented to person, place, and time.    Psychiatric:         Behavior: Behavior normal.       DIAGNOSTIC RESULTS   LABS:    Labs Reviewed   CBC WITH AUTO DIFFERENTIAL - Abnormal; Notable for the following components:       Result Value    WBC 13.7 (*)     Neutrophils Absolute 11.1 (*)     All other components within normal limits   COMPREHENSIVE METABOLIC PANEL - Abnormal; Notable for the following components:    Sodium 133 (*)     CO2 17 (*)     Glucose 171 (*)     BUN 26 (*)     Creatinine 1.5 (*)     GFR Non- 48 (*)     GFR  58 (*)     Total Bilirubin 1.1 (*)     All other components within normal limits   MAGNESIUM - Abnormal; Notable for the following components:    Magnesium 1.00 (*)     All other components within normal limits   RSV RAPID ANTIGEN   COVID-19, RAPID   RAPID INFLUENZA A/B ANTIGENS   GASTROINTESTINAL PANEL, MOLECULAR   C DIFF TOXIN/ANTIGEN   LIPASE   TROPONIN   BRAIN NATRIURETIC PEPTIDE   URINALYSIS WITH REFLEX TO CULTURE       When ordered only abnormal lab results are displayed. All other labs were within normal range or not returned as of this dictation. EKG: When ordered, EKG's are interpreted by the Emergency Department Physician in the absence of a cardiologist.  Please see their note for interpretation of EKG. RADIOLOGY:   Non-plain film images such as CT, Ultrasound and MRI are read by the radiologist. Plain radiographic images are visualized and preliminarily interpreted by the ED Provider with the below findings:        Interpretation per the Radiologist below, if available at the time of this note:    XR CHEST PORTABLE   Final Result   No acute cardiopulmonary findings                 PROCEDURES   Unless otherwise noted below, none     Procedures    CRITICAL CARE TIME   There was a high probability of life-threatening clinical deterioration in the patient's condition requiring my urgent intervention. I personally saw the patient and independently provided 40 minutes of non-concurrent critical care out of the total shared critical care time provided, excluding separately reportable procedures. CONSULTS:  IP CONSULT TO HOSPITALIST      EMERGENCY DEPARTMENT COURSE and DIFFERENTIAL DIAGNOSIS/MDM:   Vitals:    Vitals:    10/06/22 1054 10/06/22 1325   BP: 91/63 (!) 160/96   Pulse: (!) 104 89   Resp: 16    Temp: 97.8 °F (36.6 °C)    TempSrc: Oral    SpO2: 96%    Weight: 165 lb (74.8 kg)    Height: 5' 8\" (1.727 m)        Patient was given the following medications:  Medications   magnesium sulfate 2000 mg in 50 mL IVPB premix (has no administration in time range)   lactated ringers infusion 1,000 mL (1,000 mLs IntraVENous New Bag 10/6/22 1309)   ketorolac (TORADOL) injection 15 mg (15 mg IntraVENous Given 10/6/22 1128)         Is this patient to be included in the SEP-1 Core Measure due to severe sepsis or septic shock?    No   Exclusion criteria - the patient is NOT to be included for SEP-1 Core Measure due to: Alternative explanation for abnormal labs/vitals that do not relate to sepsis, see MDM for further explanation    This patient presents to the emergency department complaining of cough, rhinorrhea, body aches, diarrhea. He denies chest pain or shortness of breath. Chest x-ray is unremarkable. Lungs are clear. Abdomen is soft and nontender in all 4 quadrants without pulsatile mass or CVA tenderness. Patient does have a mild leukocytosis but is afebrile. Blood pressure and pulse rate improved. Blood work suggestive of dehydration with metabolic disturbance. I did order replacement. No overt bacterial infection identified at this time. Stool and urine cultures pending. Patient and family understand and agree with plan for admission. Hospitalist has agreed to admit. FINAL IMPRESSION      1. Hypomagnesemia    2. DANO (acute kidney injury) ([facility] [address]    3. Diarrhea, unspecified type    4. Acute upper respiratory infection          DISPOSITION/PLAN   DISPOSITION Decision To Admit 10/06/2022 01:28:56 PM      PATIENT REFERRED TO:  No follow-up provider specified.     DISCHARGE MEDICATIONS:  New Prescriptions    No medications on file       DISCONTINUED MEDICATIONS:  Discontinued Medications    No medications on file              (Please note that portions of this note were completed with a voice recognition program.  Efforts were made to edit the dictations but occasionally words are mis-transcribed.)    Maureen Lujan PA-C (electronically signed)            Maureen Lujan PA-C  10/06/22 1466

## 2022-10-06 NOTE — ED NOTES
Report given to JEANNIE Townsend. Patient alert and oriented. Patient transferred to floor by JEANNIE Townsend. Skin appropriate for ethnicity, dry and intact. No signs of distress. Regular respiratory pattern, normal respiratory depth, unlabored respirations.          Cynthia Garrison RN  10/06/22 5141

## 2022-10-06 NOTE — ED PROVIDER NOTES
(TOPROL XL) 25 MG EXTENDED RELEASE TABLET    TAKE 1 TABLET BY MOUTH IN THE MORNING    MULTIPLE VITAMIN (MULTIVITAMIN ADULT PO)    Take 1 tablet by mouth daily    OMEPRAZOLE (PRILOSEC) 20 MG DELAYED RELEASE CAPSULE    Take 20 mg by mouth daily. TADALAFIL (CIALIS) 5 MG TABLET    Take 1 tablet by mouth as needed for Erectile Dysfunction. VITAMIN D3 (CHOLECALCIFEROL) 10 MCG (400 UNIT) TABS TABLET    Take 400 Units by mouth daily       Social history:  reports that he quit smoking about 7 years ago. His smoking use included cigarettes. He has a 30.00 pack-year smoking history. He has never used smokeless tobacco. He reports current alcohol use of about 98.0 standard drinks per week. He reports current drug use. Frequency: 7.00 times per week. Drug: Marijuana [place]). Family history:    Family History   Problem Relation Age of Onset    Heart Attack Father     Heart Disease Father     Hypertension Mother     Elevated Lipids Sister     Elevated Lipids Father          Exam  ED Triage Vitals [10/06/22 1054]   BP Temp Temp Source Heart Rate Resp SpO2 Height Weight   91/63 97.8 °F (36.6 °C) Oral (!) 104 16 96 % 5' 8\" (1.727 m) 165 lb (74.8 kg)     Nursing note and vitals reviewed. Constitutional: In no acute distress  HENT:      Head: Normocephalic      Ears: External ears normal.      Nose: Nose normal.     Mouth: Membrane mucosa moist   Eyes: No discharge. Neck: Supple. Trachea midline. Cardiovascular: Regular rate. Warm extremities  Pulmonary/Chest: Effort normal. No respiratory distress. Abdominal: Soft. No distension. Nontender  : Deferred  Rectal: Deferred   Musculoskeletal: Moves all extremities. No gross deformity. Neurological: Alert and oriented. Face symmetric. Speech is clear. Skin: Warm and dry. Psychiatric: Normal mood and affect. Behavior is normal.    Procedures      EKG  The Ekg interpreted by me in the absence of a cardiologist shows. Normal sinus rhythm.    No specific ST changes appreciated.   HR 92  Lbbb  No sgarbossa criteria met  No prior EKG for comparison      Radiology  XR CHEST PORTABLE   Final Result   No acute cardiopulmonary findings             Labs  Results for orders placed or performed during the hospital encounter of 10/06/22   Rapid RSV Antigen    Specimen: Nasopharyngeal Swab   Result Value Ref Range    RSV Rapid Ag Negative Negative   COVID-19, Rapid    Specimen: Nasopharyngeal Swab   Result Value Ref Range    SARS-CoV-2, NAAT Not Detected Not Detected   Rapid influenza A/B antigens    Specimen: Nasopharyngeal   Result Value Ref Range    Rapid Influenza A Ag Negative Negative    Rapid Influenza B Ag Negative Negative   CBC with Auto Differential   Result Value Ref Range    WBC 13.7 (H) 4.0 - 11.0 K/uL    RBC 4.52 4.20 - 5.90 M/uL    Hemoglobin 14.4 13.5 - 17.5 g/dL    Hematocrit 41.0 40.5 - 52.5 %    MCV 90.6 80.0 - 100.0 fL    MCH 31.9 26.0 - 34.0 pg    MCHC 35.2 31.0 - 36.0 g/dL    RDW 13.0 12.4 - 15.4 %    Platelets 215 332 - 104 K/uL    MPV 9.1 5.0 - 10.5 fL    Neutrophils % 81.1 %    Lymphocytes % 10.3 %    Monocytes % 7.3 %    Eosinophils % 0.4 %    Basophils % 0.9 %    Neutrophils Absolute 11.1 (H) 1.7 - 7.7 K/uL    Lymphocytes Absolute 1.4 1.0 - 5.1 K/uL    Monocytes Absolute 1.0 0.0 - 1.3 K/uL    Eosinophils Absolute 0.1 0.0 - 0.6 K/uL    Basophils Absolute 0.1 0.0 - 0.2 K/uL   Comprehensive Metabolic Panel   Result Value Ref Range    Sodium 133 (L) 136 - 145 mmol/L    Potassium 3.6 3.5 - 5.1 mmol/L    Chloride 101 99 - 110 mmol/L    CO2 17 (L) 21 - 32 mmol/L    Anion Gap 15 3 - 16    Glucose 171 (H) 70 - 99 mg/dL    BUN 26 (H) 7 - 20 mg/dL    Creatinine 1.5 (H) 0.9 - 1.3 mg/dL    GFR Non- 48 (A) >60    GFR  58 (A) >60    Calcium 9.4 8.3 - 10.6 mg/dL    Total Protein 7.3 6.4 - 8.2 g/dL    Albumin 4.9 3.4 - 5.0 g/dL    Albumin/Globulin Ratio 2.0 1.1 - 2.2    Total Bilirubin 1.1 (H) 0.0 - 1.0 mg/dL    Alkaline Phosphatase 66 40 - 129 U/L    ALT 25 10 - 40 U/L    AST 29 15 - 37 U/L   Lipase   Result Value Ref Range    Lipase 45.0 13.0 - 60.0 U/L   Troponin   Result Value Ref Range    Troponin <0.01 <0.01 ng/mL   Brain Natriuretic Peptide   Result Value Ref Range    Pro- 0 - 124 pg/mL   Magnesium   Result Value Ref Range    Magnesium 1.00 (L) 1.80 - 2.40 mg/dL   Urinalysis with Reflex to Culture   Result Value Ref Range    Urine Type NotGiven    EKG 12 Lead   Result Value Ref Range    Ventricular Rate 92 BPM    Atrial Rate 92 BPM    P-R Interval 170 ms    QRS Duration 142 ms    Q-T Interval 390 ms    QTc Calculation (Bazett) 482 ms    P Axis 65 degrees    R Axis -32 degrees    T Axis 94 degrees    Diagnosis       Normal sinus rhythmLeft axis deviationLeft bundle branch block       Screenings           MDM and ED Course  This is a 61 y.o. male who presents to the ED for cough, weakness, body aches, diarrhea for 2 to 3 weeks worsening. Similar symptoms in March requiring hospitalization. Initially was hypotensive, tachycardic. Has leukocytosis. Pneumonia, viral illness in differential.  Found to be RSV, influenza, COVID-19 negative. Blood pressure improved and tachycardia improved. Found DANO and severe hypomagnesemia which is likely secondary to his diarrhea. Likely prerenal cause for DNAO. No abdominal pain to indicate obstructive nephropathy. Will check urinalysis. C. difficile in differential.  Anticipate admission to hospitalist service. [unfilled]    Is this patient to be included in the SEP-1 Core Measure due to severe sepsis or septic shock? No   Exclusion criteria - the patient is NOT to be included for SEP-1 Core Measure due to:  May have criteria for sepsis, but does not meet criteria for severe sepsis or septic shock        Final Impression  1. Hypomagnesemia    2. DANO (acute kidney injury) ([facility] [address]    3. Diarrhea, unspecified type    4.  Acute upper respiratory infection        Blood pressure (!) 160/96, pulse 89, temperature 97.8 °F (36.6 °C), temperature source Oral, resp. rate 16, height 5' 8\" (1.727 m), weight 165 lb (74.8 kg), SpO2 97 %. Disposition:  DISPOSITION Decision To Admit 10/06/2022 01:28:56 PM      Patient Referrals:  No follow-up provider specified. Discharge Medications:  New Prescriptions    No medications on file       Discontinued Medications:  Discontinued Medications    No medications on file       This chart was generated using the [address] [place] [address] dictation system. I created this record but it may contain dictation errors given the limitations of this technology.         Ita Sanabria MD  10/06/22 4074

## 2022-10-06 NOTE — PROGRESS NOTES
4 Eyes Skin Assessment     NAME:  Rosa Knight  YOB: 1965  MEDICAL RECORD NUMBER:  [id number]    The patient is being assess for  Admission    I agree that 2 RN's have performed a thorough Head to Toe Skin Assessment on the patient. ALL assessment sites listed below have been assessed. Areas assessed by both nurses:    Head, Face, Ears, Shoulders, Back, Chest, Arms, Elbows, Hands, Sacrum. Buttock, Coccyx, Ischium, and Legs. Feet and Heels        Does the Patient have a Wound?  No noted wound(s)       Shane Prevention initiated:  No   Wound Care Orders initiated:  No    Pressure Injury (Stage 3,4, Unstageable, DTI, NWPT, and Complex wounds) if present place referral/consult order under [de-identified] No    New and Established Ostomies if present place consult order under : No      Nurse 1 eSignature: Electronically signed by Lorena Douglas RN on 10/6/22 at 6:24 PM EDT    **SHARE this note so that the co-signing nurse is able to place an eSignature**    Nurse 2 eSignature: Electronically signed by Charles Christianson RN on 10/6/22 at 6:28 PM EDT

## 2022-10-06 NOTE — ED NOTES
Pt diagnosed with RSV in [place] recently. Pt having left leg pain and lower back spasms/tightness. Pt has had diarrhea for 2 weeks with SOB. Pt is on a continuous pulse oximetry and telemetry monitoring. Pt continued on cycling blood pressure. Fall risk precautions in place, call light in reach, bed side table within reach, will continue to monitor. Wife at pts bedside.           Madelin Vega RN  10/06/22 8392

## 2022-10-06 NOTE — PROGRESS NOTES
Patient has arrived to 5T. Vitals obtained. Head to toe assessment completed. Patient is awake, alert and oriented. Respirations are easy and unlabored. Patient denies pain and does not appear to be in distress. Patient oriented to room and call light. Plan of care discussed with patient, patient agreeable. Call light within reach. No further needs expressed.

## 2022-10-06 NOTE — H&P
HOSPITALISTS HISTORY AND PHYSICAL    10/6/2022 4:01 PM    Patient Information:  Aris Elder is a 61 y.o. male [id number]  PCP:  Robert Cervantes MD (Tel: [phone] )    Chief complaint:    Chief Complaint   Patient presents with    Cough     Productive cough, diarrhea, body cramps x 3 weeks. Pt has tried mucinex and dayquil with no relief. HX of admission for rsv March 2022. Denies NV        History of Present Illness:  Ubaldo Roy is a 61 y.o. male who presented with ER with complaints of cough diarrhea body cast for 3 weeks. Patient has had ongoing since few weeks. Patient said had history of RSV back in March. Patient has been having body ache fever chills. Denies any blood in the stool loose watery diarrhea. No recent antibiotics. No hemoptysis no palpitation nothing that makes it better or worse. REVIEW OF SYSTEMS:   Constitutional: Negative for fever,chills or night sweats  ENT: Negative for rhinorrhea, epistaxis, hoarseness, sore throat. Respiratory: Negative for shortness of breath,wheezing  Cardiovascular: Negative for chest pain, palpitations   Gastrointestinal: Negative for nausea, vomiting, diarrhea  Genitourinary: Negative for polyuria, dysuria   Hematologic/Lymphatic: Negative for bleeding tendency, easy bruising  Musculoskeletal: Negative for myalgias and arthralgias  Neurologic: Negative for confusion,dysarthria. Skin: Negative for itching,rash, good capillary refill. Psychiatric: Negative for depression,anxiety, agitation. Endocrine: Negative for polydipsia,polyuria,heat /cold intolerance. Past Medical History:   has a past medical history of ED (erectile dysfunction), FH: CAD (coronary artery disease), GERD (gastroesophageal reflux disease), HLD (hyperlipidemia), HTN (hypertension), and MTHFR mutation. Past Surgical History:   has a past surgical history that includes Skin cancer excision (02/2015);  Uvulectomy (2015); Spine surgery; and [place] tooth extraction. Medications:  No current facility-administered medications on file prior to encounter. Current Outpatient Medications on File Prior to Encounter   Medication Sig Dispense Refill    metoprolol succinate (TOPROL XL) 25 MG extended release tablet TAKE 1 TABLET BY MOUTH IN THE MORNING 30 tablet 2    aspirin 81 MG EC tablet Take 81 mg by mouth daily      Multiple Vitamin (MULTIVITAMIN ADULT PO) Take 1 tablet by mouth daily      ascorbic acid (VITAMIN C) 250 MG tablet Take 250 mg by mouth 3 times daily (Patient not taking: Reported on 9/1/2022)      vitamin D3 (CHOLECALCIFEROL) 10 MCG (400 UNIT) TABS tablet Take 400 Units by mouth daily      atorvastatin (LIPITOR) 80 MG tablet 1 at bed for LDL & heart. 90 tablet 3    tadalafil (CIALIS) 5 MG tablet Take 1 tablet by mouth as needed for Erectile Dysfunction. (Patient taking differently: Take 20 mg by mouth as needed for Erectile Dysfunction) 30 tablet 0    omeprazole (PRILOSEC) 20 MG delayed release capsule Take 20 mg by mouth daily. Cyanocobalamin (B-12) 1000 MCG CAPS Take  by mouth. Allergies: Allergies   Allergen Reactions    Methimazole Rash        Social History:   reports that he quit smoking about 7 years ago. His smoking use included cigarettes. He has a 30.00 pack-year smoking history. He has never used smokeless tobacco. He reports current alcohol use of about 98.0 standard drinks per week. He reports current drug use. Frequency: 7.00 times per week. Drug: Marijuana Alfornia Cashlacho). Family History:  family history includes Elevated Lipids in his father and sister; Heart Attack in his father; Heart Disease in his father; Hypertension in his mother. ,     Physical Exam:  BP (!) 162/94   Pulse 75   Temp 97.8 °F (36.6 °C) (Oral)   Resp 13   Ht 5' 8\" (1.727 m)   Wt 165 lb (74.8 kg)   SpO2 99%   BMI 25.09 kg/m²     General appearance:  Appears comfortable.  Well nourished  Eyes: Sclera clear, pupils equal  ENT: Moist mucus membranes, no thrush. Trachea midline. Cardiovascular: Regular rhythm, normal S1, S2. No murmur, gallop, rub. No edema in lower extremities  Respiratory: Clear to auscultation bilaterally, no wheeze, good inspiratory effort  Gastrointestinal: Abdomen soft, non-tender, not distended, normal bowel sounds  Musculoskeletal: No cyanosis in digits, neck supple  Neurology: Cranial nerves grossly intact. Alert and oriented in time, place and person. No speech or motor deficits  Psychiatry: Appropriate affect. Not agitated  Skin: Warm, dry, normal turgor, no rash    Labs:  CBC:   Lab Results   Component Value Date/Time    WBC 13.7 10/06/2022 11:34 AM    RBC 4.52 10/06/2022 11:34 AM    HGB 14.4 10/06/2022 11:34 AM    HCT 41.0 10/06/2022 11:34 AM    MCV 90.6 10/06/2022 11:34 AM    MCH 31.9 10/06/2022 11:34 AM    MCHC 35.2 10/06/2022 11:34 AM    RDW 13.0 10/06/2022 11:34 AM     10/06/2022 11:34 AM    MPV 9.1 10/06/2022 11:34 AM     BMP:    Lab Results   Component Value Date/Time     10/06/2022 11:34 AM    K 3.6 10/06/2022 11:34 AM     10/06/2022 11:34 AM    CO2 17 10/06/2022 11:34 AM    BUN 26 10/06/2022 11:34 AM    CREATININE 1.5 10/06/2022 11:34 AM    CALCIUM 9.4 10/06/2022 11:34 AM    GFRAA 58 10/06/2022 11:34 AM    LABGLOM 48 10/06/2022 11:34 AM    GLUCOSE 171 10/06/2022 11:34 AM       Chest Xray:   EKG:    I visualized CXR images and EKG strips     Discussed  with     Problem List  Principal Problem:    DANO (acute kidney injury) ([facility] [address]  Resolved Problems:    * No resolved hospital problems. *        Assessment/Plan:   Viral illness  -With body ache diarrhea cough.   -Scans are negative for any acute CT  -We will give supportive care  -Hydration advance diet as tolerated  -We will send stool studies as well to see if any finding for diarrhea      Acute kidney injury  -With hypomagnesemia hypokalemia  -Nuris prerenal we will give IV hydration monitor        Celsa Ortiz MD 10/6/2022 4:01 PM

## 2022-10-07 VITALS
WEIGHT: 162.3 LBS | RESPIRATION RATE: 18 BRPM | DIASTOLIC BLOOD PRESSURE: 82 MMHG | SYSTOLIC BLOOD PRESSURE: 133 MMHG | TEMPERATURE: 98.7 F | BODY MASS INDEX: 24.6 KG/M2 | HEIGHT: 68 IN | HEART RATE: 77 BPM | OXYGEN SATURATION: 97 %

## 2022-10-07 LAB
ANION GAP SERPL CALCULATED.3IONS-SCNC: 11 MMOL/L (ref 3–16)
ANION GAP SERPL CALCULATED.3IONS-SCNC: 11 MMOL/L (ref 3–16)
BASOPHILS ABSOLUTE: 0.1 K/UL (ref 0–0.2)
BASOPHILS RELATIVE PERCENT: 0.6 %
BUN BLDV-MCNC: 19 MG/DL (ref 7–20)
BUN BLDV-MCNC: 20 MG/DL (ref 7–20)
C DIFF TOXIN/ANTIGEN: NORMAL
CALCIUM SERPL-MCNC: 8 MG/DL (ref 8.3–10.6)
CALCIUM SERPL-MCNC: 8.4 MG/DL (ref 8.3–10.6)
CHLORIDE BLD-SCNC: 107 MMOL/L (ref 99–110)
CHLORIDE BLD-SCNC: 108 MMOL/L (ref 99–110)
CO2: 19 MMOL/L (ref 21–32)
CO2: 20 MMOL/L (ref 21–32)
CREAT SERPL-MCNC: 1 MG/DL (ref 0.9–1.3)
CREAT SERPL-MCNC: 1.2 MG/DL (ref 0.9–1.3)
CREATININE URINE: 102.4 MG/DL (ref 39–259)
EKG ATRIAL RATE: 92 BPM
EKG DIAGNOSIS: NORMAL
EKG P AXIS: 65 DEGREES
EKG P-R INTERVAL: 170 MS
EKG Q-T INTERVAL: 390 MS
EKG QRS DURATION: 142 MS
EKG QTC CALCULATION (BAZETT): 482 MS
EKG R AXIS: -32 DEGREES
EKG T AXIS: 94 DEGREES
EKG VENTRICULAR RATE: 92 BPM
EOSINOPHILS ABSOLUTE: 0.2 K/UL (ref 0–0.6)
EOSINOPHILS RELATIVE PERCENT: 2.4 %
GFR AFRICAN AMERICAN: >60
GFR AFRICAN AMERICAN: >60
GFR NON-AFRICAN AMERICAN: >60
GFR NON-AFRICAN AMERICAN: >60
GLUCOSE BLD-MCNC: 102 MG/DL (ref 70–99)
GLUCOSE BLD-MCNC: 105 MG/DL (ref 70–99)
HCT VFR BLD CALC: 36.9 % (ref 40.5–52.5)
HEMOGLOBIN: 12.6 G/DL (ref 13.5–17.5)
LYMPHOCYTES ABSOLUTE: 1.3 K/UL (ref 1–5.1)
LYMPHOCYTES RELATIVE PERCENT: 15.5 %
MAGNESIUM: 1.8 MG/DL (ref 1.8–2.4)
MCH RBC QN AUTO: 31.7 PG (ref 26–34)
MCHC RBC AUTO-ENTMCNC: 34.1 G/DL (ref 31–36)
MCV RBC AUTO: 93 FL (ref 80–100)
MONOCYTES ABSOLUTE: 0.7 K/UL (ref 0–1.3)
MONOCYTES RELATIVE PERCENT: 7.9 %
NEUTROPHILS ABSOLUTE: 6.3 K/UL (ref 1.7–7.7)
NEUTROPHILS RELATIVE PERCENT: 73.6 %
PDW BLD-RTO: 13 % (ref 12.4–15.4)
PLATELET # BLD: 191 K/UL (ref 135–450)
PMV BLD AUTO: 9.5 FL (ref 5–10.5)
POTASSIUM REFLEX MAGNESIUM: 3.7 MMOL/L (ref 3.5–5.1)
POTASSIUM SERPL-SCNC: 4 MMOL/L (ref 3.5–5.1)
RBC # BLD: 3.97 M/UL (ref 4.2–5.9)
SODIUM BLD-SCNC: 137 MMOL/L (ref 136–145)
SODIUM BLD-SCNC: 139 MMOL/L (ref 136–145)
SODIUM URINE: 56 MMOL/L
WBC # BLD: 8.5 K/UL (ref 4–11)

## 2022-10-07 PROCEDURE — 87506 IADNA-DNA/RNA PROBE TQ 6-11: CPT

## 2022-10-07 PROCEDURE — 82570 ASSAY OF URINE CREATININE: CPT

## 2022-10-07 PROCEDURE — 87324 CLOSTRIDIUM AG IA: CPT

## 2022-10-07 PROCEDURE — 85025 COMPLETE CBC W/AUTO DIFF WBC: CPT

## 2022-10-07 PROCEDURE — 80048 BASIC METABOLIC PNL TOTAL CA: CPT

## 2022-10-07 PROCEDURE — 84300 ASSAY OF URINE SODIUM: CPT

## 2022-10-07 PROCEDURE — 2580000003 HC RX 258: Performed by: HOSPITALIST

## 2022-10-07 PROCEDURE — 93010 ELECTROCARDIOGRAM REPORT: CPT | Performed by: INTERNAL MEDICINE

## 2022-10-07 PROCEDURE — 36415 COLL VENOUS BLD VENIPUNCTURE: CPT

## 2022-10-07 PROCEDURE — G0378 HOSPITAL OBSERVATION PER HR: HCPCS

## 2022-10-07 PROCEDURE — 6370000000 HC RX 637 (ALT 250 FOR IP): Performed by: INTERNAL MEDICINE

## 2022-10-07 PROCEDURE — 87449 NOS EACH ORGANISM AG IA: CPT

## 2022-10-07 PROCEDURE — 83735 ASSAY OF MAGNESIUM: CPT

## 2022-10-07 PROCEDURE — 6370000000 HC RX 637 (ALT 250 FOR IP): Performed by: HOSPITALIST

## 2022-10-07 RX ORDER — LANOLIN ALCOHOL/MO/W.PET/CERES
400 CREAM (GRAM) TOPICAL DAILY
Qty: 30 TABLET | Refills: 0 | Status: SHIPPED | OUTPATIENT
Start: 2022-10-08

## 2022-10-07 RX ADMIN — ASPIRIN 81 MG: 81 TABLET, COATED ORAL at 08:21

## 2022-10-07 RX ADMIN — SODIUM CHLORIDE: 9 INJECTION, SOLUTION INTRAVENOUS at 03:22

## 2022-10-07 RX ADMIN — SODIUM CHLORIDE: 9 INJECTION, SOLUTION INTRAVENOUS at 10:20

## 2022-10-07 RX ADMIN — Medication 400 MG: at 08:21

## 2022-10-07 RX ADMIN — ATORVASTATIN CALCIUM 10 MG: 10 TABLET, FILM COATED ORAL at 08:21

## 2022-10-07 NOTE — PROGRESS NOTES
This patient has had a discharge order placed. They are returning home and being picked up in the lobby by Diana to be transported home. Discharge paperwork has been printed, highlighted, and gone over with the patient by this RN. Patient understands teaching and has no further questions at this time. IV has been removed with no complications. Telemetry has been removed. Pt has all belongings present. No further needs expressed.

## 2022-10-07 NOTE — PROGRESS NOTES
Shift assessment completed. Routine vitals obtained. Scheduled medications given. Patient is awake, alert and oriented. Respirations are easy and unlabored. Patient denies pain and does not appear to be in distress, resting comfortably at this time. Call light within reach. No further needs expressed.

## 2022-10-07 NOTE — DISCHARGE INSTR - COC
Continuity of Care Form    Patient Name: Carmen Jeter   :  1965  MRN:  [id number]    Admit date:  10/6/2022  Discharge date:  ***    Code Status Order: Full Code   Advance Directives:     Admitting Physician:  Tony Barton MD  PCP: Yoni Alex MD    Discharging Nurse: [facility] Unit/Room#: [id number]  Discharging Unit Phone Number: ***    Emergency Contact:   Extended Emergency Contact Information  Primary Emergency Contact: Karen Gutierrez  Mobile Phone: [phone]  Relation: Spouse  Preferred language: English   needed?  No    Past Surgical History:  Past Surgical History:   Procedure Laterality Date    SKIN CANCER EXCISION  2015    Melanoma on face    SPINE SURGERY      UVULECTOMY      WISDOM TOOTH EXTRACTION         Immunization History:   Immunization History   Administered Date(s) Administered    COVID-19, PFIZER Bivalent BOOSTER, (age 12y+), IM, 30 mcg/0.3 mL dose 2022    COVID-19, PFIZER GRAY top, DO NOT Dilute, (age 15 y+), IM, 30 mcg/0.3 mL 2022    COVID-19, PFIZER PURPLE top, DILUTE for use, (age 15 y+), 30mcg/0.3mL 2021, 2021, 2021    Influenza Virus Vaccine 10/30/2013, 2014, 10/21/2015, 10/20/2016, 10/19/2017, 10/09/2018, 2019, 2019, 2020, 10/14/2021    Influenza, FLUARIX, FLULAVAL, Chidi Hernandez (age 10 mo+) AND AFLURIA, (age 1 y+), PF, 0.5mL 10/09/2018, 2019, 2020, 10/14/2021    Pneumococcal Polysaccharide ([id number]) 10/30/2013, 2021    Tdap (Boostrix, Adacel) 2019    Zoster Recombinant (Shingrix) 2020, 2020       Active Problems:  Patient Active Problem List   Diagnosis Code    MTHFR mutation Z15.89    ED (erectile dysfunction) N52.9    GERD (gastroesophageal reflux disease) K21.9    HTN (hypertension) I10    HLD (hyperlipidemia) E78.5    FH: CAD (coronary artery disease) Z82.49    [facility] N17.9       Isolation/Infection:   Isolation No Isolation          Patient Infection Status       Infection Onset Added Last Indicated Last Indicated By Review Planned Expiration Resolved Resolved By    None active    Resolved    C-diff Rule Out 10/06/22 10/06/22 10/07/22 Clostridium difficile toxin/antigen (Ordered)   10/07/22 Rule-Out Test Resulted    COVID-19 (Rule Out) 10/06/22 10/06/22 10/06/22 COVID-19, Rapid (Ordered)   10/06/22 Rule-Out Test Resulted            Nurse Assessment:  Last Vital Signs: /82   Pulse 77   Temp 98.7 °F (37.1 °C) (Oral)   Resp 18   Ht 5' 8\" (1.727 m)   Wt 162 lb 4.8 oz (73.6 kg)   SpO2 97%   BMI 24.68 kg/m²     Last documented pain score (0-10 scale):    Last Weight:   Wt Readings from Last 1 Encounters:   10/06/22 162 lb 4.8 oz (73.6 kg)     Mental Status:  {IP PT MENTAL STATUS:20030}    IV Access:  { AB IV ACCESS:809790341}    Nursing Mobility/ADLs:  Walking   {P DME XGVP:749934307}  Transfer  {P DME SWXK:586235252}  Bathing  {P DME GQTK:027197666}  Dressing  {P DME CCBA:386487465}  Toileting  {CHP DME VTNT:627315388}  Feeding  {P DME RMWS:628867996}  Med Admin  {[facility] DME YNAU:017227994}  Med Delivery   { AB MED Delivery:253497692}    Wound Care Documentation and Therapy:        Elimination:  Continence: Bowel: {YES / RO:11012}  Bladder: {YES / KW:89712}  Urinary Catheter: {Urinary Catheter:421392451}   Colostomy/Ileostomy/Ileal Conduit: {YES / SE:79782}       Date of Last BM: ***    Intake/Output Summary (Last 24 hours) at 10/7/2022 1314  Last data filed at 10/7/2022 0323  Gross per 24 hour   Intake 2211 ml   Output --   Net 2211 ml     I/O last 3 completed shifts:   In: 2211 [I.V.:2211]  Out: -     Safety Concerns:     [address] y[address]an AB Safety Concerns:278951056}    Impairments/Disabilities:      [address] BERGER Impairments/Disabilities:510152053}    Nutrition Therapy:  Current Nutrition Therapy:   [address] Ryan AB Diet List:992950879}    Routes of Feeding: {CHP DME Other Feedings:228711979}  Liquids: {Slp liquid thickness:29306}  Daily Fluid Restriction: {CHP DME Yes amt example:568428371}  Last Modified Barium Swallow with Video (Video Swallowing Test): {Done Not Done BXTV:993184580}    Treatments at the Time of Hospital Discharge:   Respiratory Treatments: ***  Oxygen Therapy:  {Therapy; copd oxygen:13827}  Ventilator:    {MH CC Vent AHXJ:972969130}    Rehab Therapies: {THERAPEUTIC INTERVENTION:5682996836}  Weight Bearing Status/Restrictions: {MH CC Weight Bearin}  Other Medical Equipment (for information only, NOT a DME order):  {EQUIPMENT:872082698}  Other Treatments: ***    Patient's personal belongings (please select all that are sent with patient):  {CHP DME Belongings:410065129}    RN SIGNATURE:  {Esignature:431032444}    CASE MANAGEMENT/SOCIAL WORK SECTION    Inpatient Status Date: ***    Readmission Risk Assessment Score:  Readmission Risk              Risk of Unplanned Readmission:  10           Discharging to Facility/ Agency   Name:   Address:  Phone:  Fax:    Dialysis Facility (if applicable)   Name:  Address:  Dialysis Schedule:  Phone:  Fax:    / signature: {Esignature:199030016}    PHYSICIAN SECTION    Prognosis: {Prognosis:7006414932}    Condition at Discharge: [address] [place] [address] Patient Condition:194190042}    Rehab Potential (if transferring to Rehab): {Prognosis:7538450289}    Recommended Labs or Other Treatments After Discharge: ***    Physician Certification: I certify the above information and transfer of Carolina Burroughs  is necessary for the continuing treatment of the diagnosis listed and that he requires {Admit to Appropriate Level of Care:17871} for {GREATER/LESS:774670367} 30 days.      Update Admission H&P: {CHP DME Changes in TIXCW:584878212}    PHYSICIAN SIGNATURE:  {Esignature:509667061}

## 2022-10-07 NOTE — CONSULTS
Office : [phone]     Fax :[phone]       Nephrology Consult Note      Patient's Name: Aliyah Witt  11:54 AM  10/7/2022    Reason for Consult: DANO      Requesting Physician:  Bea Cordon MD      Chief Complaint:    Chief Complaint   Patient presents with    Cough     Productive cough, diarrhea, body cramps x 3 weeks. Pt has tried mucinex and dayquil with no relief. HX of admission for rsv March 2022. Denies NV           History of Present iIlness:    Aliyah Witt is a 61 y.o. male with prior history of CAD, HTN who presented with ER with complaints of cough diarrhea body cast for 3 weeks. Patient has had ongoing since few weeks. Patient said had history of RSV back in March. Patient has been having body ache fever chills. Denies any blood in the stool , has loose watery diarrhea for last 2 weeks. No recent antibiotics. No hemoptysis no palpitation nothing that makes it better or worse. Has DANO . Creat high at 1.5   Baseline creat 1.0   Low mag level of 1.0   HCO3 low at 17    I/O last 3 completed shifts:   In: 2211 [I.V.:2211]  Out: -     Past Medical History:   Diagnosis Date    ED (erectile dysfunction)     FH: CAD (coronary artery disease)     GERD (gastroesophageal reflux disease)     HLD (hyperlipidemia)     HTN (hypertension)     MTHFR mutation     heterozygous; takes vitamins (B6, B12, folate)       Past Surgical History:   Procedure Laterality Date    SKIN CANCER EXCISION  02/2015    Melanoma on face    SPINE SURGERY      UVULECTOMY  2015    WISDOM TOOTH EXTRACTION         Family History   Problem Relation Age of Onset    Heart Attack Father     Heart Disease Father     Hypertension Mother     Elevated Lipids Sister     Elevated Lipids Father         reports that he quit smoking about 7 years ago. His smoking use included cigarettes. He has a 30.00 pack-year smoking history. He has never used smokeless tobacco. He reports current alcohol use of about 98.0 standard drinks per week. He reports current drug use. Frequency: 7.00 times per week. Drug: Marijuana Janae Cotton).         Allergies:  Methimazole    Current Medications:    aspirin EC tablet 81 mg, Daily  atorvastatin (LIPITOR) tablet 10 mg, Daily  0.9 % sodium chloride infusion, Continuous  magnesium sulfate 1000 mg in dextrose 5% 100 mL IVPB, PRN  potassium chloride (KLOR-CON M) extended release tablet 40 mEq, PRN   Or  potassium bicarb-citric acid (EFFER-K) effervescent tablet 40 mEq, PRN   Or  potassium chloride 10 mEq/100 mL IVPB (Peripheral Line), PRN  sodium chloride flush 0.9 % injection 5-40 mL, 2 times per day  sodium chloride flush 0.9 % injection 5-40 mL, PRN  0.9 % sodium chloride infusion, PRN  enoxaparin (LOVENOX) injection 40 mg, Nightly  ondansetron (ZOFRAN-ODT) disintegrating tablet 4 mg, Q8H PRN   Or  ondansetron (ZOFRAN) injection 4 mg, Q6H PRN  polyethylene glycol (GLYCOLAX) packet 17 g, Daily PRN  acetaminophen (TYLENOL) tablet 650 mg, Q6H PRN   Or  acetaminophen (TYLENOL) suppository 650 mg, Q6H PRN  morphine (PF) injection 2 mg, Q4H PRN  magnesium oxide (MAG-OX) tablet 400 mg, Daily  melatonin tablet 3 mg, Nightly PRN      Review of Systems:   14 point ROS obtained but were negative except mentioned in HPI      Physical exam:     Vitals:  /82   Pulse 77   Temp 98.7 °F (37.1 °C) (Oral)   Resp 18   Ht 5' 8\" (1.727 m)   Wt 162 lb 4.8 oz (73.6 kg)   SpO2 97%   BMI 24.68 kg/m²   Constitutional:  OAA X3 NAD  Skin: no rash, turgor wnl  Heent:  eomi, mmm  Neck: no bruits or jvd noted  Cardiovascular:  S1, S2 without m/r/g  Respiratory: CTA B without w/r/r  Abdomen:  +bs, soft, nt, nd  Ext: no  lower extremity edema  Psychiatric: mood and affect appropriate  Musculoskeletal:  Rom, muscular strength intact    Labs:  CBC:   Recent Labs     10/06/22  1134 10/07/22  0613   WBC 13.7* 8.5   HGB 14.4 12.6*    191     BMP:    Recent Labs     10/06/22  1134 10/07/22  0613   * 139  137   K 3.6 4.0  3.7    108  107   CO2 17* 20*  19*   BUN 26* 20  19   CREATININE 1.5* 1.2  1.0   GLUCOSE 171* 102*  105*     Ca/Mg/Phos:   Recent Labs     10/06/22  1134 10/06/22  1803 10/07/22  0613   CALCIUM 9.4  --  8.4  8.0*   MG 1.00* 1.60* 1.80     Hepatic:   Recent Labs     10/06/22  1134   AST 29   ALT 25   BILITOT 1.1*   ALKPHOS 66     Troponin:   Recent Labs     10/06/22  1134   TROPONINI <0.01     BNP: No results for input(s): BNP in the last 72 hours. Lipids: No results for input(s): CHOL, TRIG, HDL, LDLCALC, LABVLDL in the last 72 hours. ABGs: No results for input(s): PHART, PO2ART, WLO2KLZ in the last 72 hours. INR: No results for input(s): INR in the last 72 hours. UA:  Recent Labs     10/06/22  1340   COLORU DARK YELLOW*   CLARITYU Clear   GLUCOSEU Negative   BILIRUBINUR SMALL*   KETUA 15*   SPECGRAV >=1.030   BLOODU Negative   PHUR 5.5   PROTEINU 30*   UROBILINOGEN 1.0   NITRU Negative   LEUKOCYTESUR Negative   LABMICR YES   URINETYPE NotGiven      Urine Microscopic:   Recent Labs     10/06/22  1340   BACTERIA None Seen   HYALCAST 13*  Present*   WBCUA 2   RBCUA 1   EPIU 2     Urine Culture: No results for input(s): LABURIN in the last 72 hours. Urine Chemistry: No results for input(s): Esther Amour, PROTEINUR, NAUR in the last 72 hours. IMAGING:  XR CHEST PORTABLE   Final Result   No acute cardiopulmonary findings             Prior to Admission medications    Medication Sig Start Date End Date Taking?  Authorizing Provider   magnesium oxide (MAG-OX) 400 (240 Mg) MG tablet Take 1 tablet by mouth daily 10/8/22  Yes Abe Dee MD   b complex vitamins capsule Take 1 capsule by mouth daily   Yes Historical Provider, MD   metoprolol succinate (TOPROL XL) 25 MG extended release tablet TAKE 1 TABLET BY MOUTH IN THE MORNING 9/21/22   Lonie Goltz, MD   aspirin 81 MG EC tablet Take 81 mg by mouth daily    Historical Provider, MD   Multiple Vitamin (MULTIVITAMIN ADULT PO) Take 1 tablet by mouth daily    Historical Provider, MD   ascorbic acid (VITAMIN C) 250 MG tablet Take 250 mg by mouth 3 times daily  Patient not taking: No sig reported    Historical Provider, MD   vitamin D3 (CHOLECALCIFEROL) 10 MCG (400 UNIT) TABS tablet Take 400 Units by mouth daily    Historical Provider, MD   atorvastatin (LIPITOR) 80 MG tablet 1 at bed for LDL & heart. 2/24/14   Gisselle Davison MD   omeprazole (PRILOSEC) 20 MG delayed release capsule Take 20 mg by mouth daily. Historical Provider, MD   Cyanocobalamin (B-12) 1000 MCG CAPS Take  by mouth. Historical Provider, MD           Assessment/Plan :      1. DANO pre renal   Iv fluids   Responded   Renal function better   Started to make urine   Good UOP     2. Hypotension. Improved with fluids     3. Metabolic acidosis   Improving     4. Hypomagnesemia.  Replaced     Stable from nephrology standpoint             D/w primary team      Thank you for allowing us to participate in care of Landon Lemus         Electronically signed by: Noe Lyon MD, 10/7/2022, 11:54 AM      [facility] [address]  Office : [phone]  Fax :[phone]

## 2022-10-08 LAB — GI BACTERIAL PATHOGENS BY PCR: NORMAL

## 2022-10-10 NOTE — DISCHARGE SUMMARY
[facility] DISCHARGE SUMMARY    Patient Demographics    Patient. Fred Kirk  Date of Birth. 1965  MRN. [id number]     Primary care provider. William Orta MD  (Tel: [phone])    Admit date: 10/6/2022    Discharge date (blank if same as Note Date): 10/7/2022  Note Date: 10/10/2022     Reason for Hospitalization. Chief Complaint   Patient presents with    Cough     Productive cough, diarrhea, body cramps x 3 weeks. Pt has tried mucinex and dayquil with no relief. HX of admission for rsv March 2022. Denies [address]. Acute kidney injury  With severe dehydration with hypomagnesemia likely secondary to viral illness and diarrhea. Resolved after IV hydration patient was discharged stable    Consults. IP CONSULT TO HOSPITALIST  IP CONSULT TO NEPHROLOGY    Physical examination on discharge day. /82   Pulse 77   Temp 98.7 °F (37.1 °C) (Oral)   Resp 18   Ht 5' 8\" (1.727 m)   Wt 162 lb 4.8 oz (73.6 kg)   SpO2 97%   BMI 24.68 kg/m²   General appearance. Alert. Looks comfortable. HEENT. Sclera clear. Moist mucus membranes. Cardiovascular. Regular rate and rhythm, normal S1, S2. No murmur. Respiratory. Not using accessory muscles. Clear to auscultation bilaterally, no wheeze. Gastrointestinal. Abdomen soft, non-tender, not distended, normal bowel sounds  Neurology. Facial symmetry. No speech deficits. Moving all extremities equally. Extremities. No edema in lower extremities. Skin. Warm, dry, normal turgor    Condition at time of discharge stable     Medication instructions provided to patient at discharge.      Medication List        START taking these medications      magnesium oxide 400 (240 Mg) MG tablet  Commonly known as: MAG-OX  Take 1 tablet by mouth daily            CONTINUE taking these medications      aspirin 81 MG EC tablet     atorvastatin

## 2022-10-11 ENCOUNTER — PATIENT MESSAGE (OUTPATIENT)
Dept: PRIMARY CARE CLINIC | Age: 57
End: 2022-10-11

## 2022-10-12 NOTE — TELEPHONE ENCOUNTER
From: Bryanna Watts  To: Dr. Reynolds Graft: 10/11/2022 7:21 PM EDT  Subject: Question regarding EKG 12-LEAD    Do I need to see a cardiologist ?

## 2022-10-13 ENCOUNTER — OFFICE VISIT (OUTPATIENT)
Dept: PRIMARY CARE CLINIC | Age: 57
End: 2022-10-13
Payer: COMMERCIAL

## 2022-10-13 VITALS
HEIGHT: 68 IN | SYSTOLIC BLOOD PRESSURE: 130 MMHG | DIASTOLIC BLOOD PRESSURE: 70 MMHG | OXYGEN SATURATION: 97 % | TEMPERATURE: 98 F | WEIGHT: 168 LBS | BODY MASS INDEX: 25.46 KG/M2 | HEART RATE: 80 BPM | RESPIRATION RATE: 16 BRPM

## 2022-10-13 DIAGNOSIS — E86.0 DEHYDRATION: ICD-10-CM

## 2022-10-13 DIAGNOSIS — K21.9 GASTROESOPHAGEAL REFLUX DISEASE WITHOUT ESOPHAGITIS: ICD-10-CM

## 2022-10-13 DIAGNOSIS — A08.4 VIRAL GASTROENTERITIS: Primary | ICD-10-CM

## 2022-10-13 DIAGNOSIS — E83.42 HYPOMAGNESEMIA: ICD-10-CM

## 2022-10-13 DIAGNOSIS — I44.7 LBBB (LEFT BUNDLE BRANCH BLOCK): ICD-10-CM

## 2022-10-13 PROCEDURE — 99214 OFFICE O/P EST MOD 30 MIN: CPT | Performed by: FAMILY MEDICINE

## 2022-10-13 RX ORDER — TADALAFIL 20 MG/1
TABLET ORAL
COMMUNITY
Start: 2022-09-03

## 2022-10-13 RX ORDER — FAMOTIDINE 20 MG/1
20 TABLET, FILM COATED ORAL DAILY
Qty: 30 TABLET | Refills: 2 | Status: SHIPPED | OUTPATIENT
Start: 2022-10-13

## 2022-10-13 ASSESSMENT — ENCOUNTER SYMPTOMS
COUGH: 0
VOMITING: 0
BLOOD IN STOOL: 0
DIARRHEA: 0
ABDOMINAL PAIN: 0
NAUSEA: 0
SORE THROAT: 0
SHORTNESS OF BREATH: 0

## 2022-10-13 NOTE — PROGRESS NOTES
Chief Complaint   Patient presents with    Follow-up     Pt was admitted to the hospital on Thursday 10/, severe dehydration, EKG that showed a blockage          Kasey Blake is a 61 y.o. male who presents for hospital followup visit. Pt had a viral gastroenteritis with diarrhea x 2 weeks. Pt denies any associated fever, N/V or abdominal pain. Pt went to the ER and was found to be with severe dehydration with acute renal injury and hypomagnesemia. Pt had a Negative COVID, influenzae and RSV test and had Negative stool culture and C diff testing. Pt was treated with IV fluids with improvement with normal BUN 20 and creatinine 1.2 on day of discharge. Pt did have an EKG done which showed a LBBB (which was present back on an EKG done 03/2022) Pt was discharged home on mag oxide daily and had a normal Mag level 1.8 on day of discharge     Pt did see Ortho regarding his hip avascular necrosis and had MRI hips done and plan is for radiologic surveillance as pt is w/o any pain     Pt has a hx of HTN and dyslipidemia and is on lipitor, lopressor and a Baby ASA     Pt denies any hx of CAD or diabetes     Pt is on prilosec for GERD but stopped taking it several days ago given his low magnesium level and has a hx of chronic anemia and is followed by Hematology     Pt had a colonoscopy 12/30/2021 which was normal except for several benign polyps and mild diverticulosis     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  Pt smokes marijuana daily     FHx positive for alcoholism and early CAD(father) and glaucoma (mother)     Review of Systems   Constitutional:  Negative for fatigue and fever. HENT:  Positive for congestion (and took OTC zyrtec with some relief). Negative for nosebleeds and sore throat. Eyes:         Positive blindness in L eye   Respiratory:  Negative for cough and shortness of breath. Cardiovascular:  Negative for chest pain, palpitations and leg swelling. Gastrointestinal:  Negative for abdominal pain, blood in stool, diarrhea, nausea and vomiting. Negative melena or indigestion   Endocrine: Negative for polydipsia and polyuria. Genitourinary:  Negative for dysuria and hematuria. Musculoskeletal:  Positive for neck pain (at times from DDD). Negative for arthralgias. Skin:  Negative for rash. Neurological:  Negative for dizziness, seizures, syncope, speech difficulty, weakness and headaches. Psychiatric/Behavioral:  Negative for dysphoric mood. The patient is not nervous/anxious. Vitals:    10/13/22 1422   BP: 130/70   Pulse: 80   Resp: 16   Temp: 98 °F (36.7 °C)   SpO2: 97%         Physical Exam  Vitals reviewed. Constitutional:       General: He is not in acute distress. HENT:      Mouth/Throat:      Mouth: Mucous membranes are moist.      Pharynx: Oropharynx is clear. Eyes:      General: No scleral icterus. Comments: Pink conjunctivae    Neck:      Thyroid: No thyromegaly. Comments: No carotid bruits noted B/L  Cardiovascular:      Heart sounds: Normal heart sounds. No murmur heard. No friction rub. No gallop. Pulmonary:      Effort: Pulmonary effort is normal.      Breath sounds: Normal breath sounds. Abdominal:      Palpations: Abdomen is soft. Tenderness: no abdominal tenderness   Musculoskeletal:      Comments: No leg edema noted B/L   Lymphadenopathy:      Cervical: No cervical adenopathy. Skin:     Findings: No rash. Neurological:      Mental Status: He is alert.       Comments: Cranial nerves 2 - 12 grossly intact; Muscle strength 5/5 throughout   Psychiatric:         Mood and Affect: Mood normal.       ASSESSMENT AND PLAN    1. Gastroesophageal reflux disease without esophagitis  Pt stopped taking his PPI medication secondary to recent hypomagnesemia and will start pt on an H2 blocker for control; especially given his chronic alcohol use   - famotidine (PEPCID) 20 MG tablet; Take 1 tablet by mouth daily  Dispense: 30 tablet; Refill: 2    2. Viral gastroenteritis/Dehydration/Hypomagnesemia  Patient clinically improved s/p treatment with IV fluids and is with no leg edema and a nontender abdomen on PE    5. LBBB (left bundle branch block)  Pt denies any CP or SOB and will Refer pt to Cardiology for evaluation   - Tenisha Cardozo MD, Cardiology, [facility]      Radha Gómez MD      Return in about 2 months (around 12/13/2022).

## 2022-11-09 PROBLEM — Z78.9 ALCOHOL USE: Status: ACTIVE | Noted: 2022-11-09

## 2022-11-09 PROBLEM — F10.90 ALCOHOL USE: Status: ACTIVE | Noted: 2022-11-09

## 2022-11-09 PROBLEM — I44.7 LBBB (LEFT BUNDLE BRANCH BLOCK): Status: ACTIVE | Noted: 2022-11-09

## 2022-11-09 PROBLEM — I42.9 CARDIOMYOPATHY (HCC): Status: ACTIVE | Noted: 2022-11-09

## 2022-11-09 NOTE — PATIENT INSTRUCTIONS
Echo  Please check blood pressure at home, three times a week  Goal less than 130/80        How to take a blood pressure by the American Heart Association   - don't smoke drink caffeinated beverages or exercise within 30 minutes before measuring your blood pressure. Empty your bladder and ensure at least 5 minutes of quiet rest before measurement. - sit with you back straight and supported ( on a dining chair, rather than a sofa) your feet should be flat on the floor and your legs should not be crossed. Our arm should be supported on a flat surface (such as a table) with the upper arm at heart level. Make sure the bottom of the cuff is placed directly above the bend of the elbow. - don't take the measurements over clothes.     Date             Blood Pressure          Comment                                                 Date             Blood Pressure          Comment      ----------          -------------------          ---------------------------------                         ----------          -------------------          ---------------------------------        ----------          -------------------          ---------------------------------                         ----------          -------------------          ---------------------------------        ----------          -------------------          ---------------------------------                         ----------          -------------------          ---------------------------------        ----------          -------------------          ---------------------------------                        ----------          -------------------          ---------------------------------        ----------          -------------------          ---------------------------------                        ----------          -------------------          ---------------------------------      Call or send via My Chart above readings on -----------------------------------------

## 2022-11-09 NOTE — PROGRESS NOTES
[address]  11/10/22  Referring: Carlita Capone CONSULT/CHIEF COMPLAINT/HPI     Reason for visit/ Chief complaint  New patient  LBB   HPI Ubaldo Roy is a 61 y.o. seen as a new patient in consult with Dr Gayla Ward for LBBB (diagnosed 2016), hypertension, hyperlipidemia, GERD. He is legally blind inhis left eye secondary to central retinal ocular occlusion. Suffers from alcohol abuse, avascular necrosis. Jani Crowell quit smoking in 2016  (1 ppd x 30 years) smokes marijuana daily, drinks couple beers a day. Was a heavy drinker for 30 years. Father had early cad ( diagnosed in his 42's smoked 3ppd, alcohol), mother glaucoma. Maternal aunt had a valve replacement. He is , works as a .  He has a three year old [place]. Enjoys [employer], walking the dog and biking. Today, he states he is doing well. He reports that he is fatigued and has dyspnea on exertion. No chest pain, palpitations or dizziness. No syncope. No bloating, edema or orthopnea. Will be starting testosterone in hopes to improve his energy level. Patient is adherent with medications and is tolerating them well without side effects     HISTORY/ALLERGIES/ROS     MedHx:  has a past medical history of ED (erectile dysfunction), FH: CAD (coronary artery disease), GERD (gastroesophageal reflux disease), HLD (hyperlipidemia), HTN (hypertension), and MTHFR mutation. SurgHx:  has a past surgical history that includes Skin cancer excision (02/2015); Uvulectomy (2015); Spine surgery; and [place] tooth extraction. SocHx:  reports that he quit smoking about 7 years ago. His smoking use included cigarettes. He has a 30.00 pack-year smoking history. He has never used smokeless tobacco. He reports current alcohol use of about 14.0 standard drinks per week. He reports current drug use. Frequency: 7.00 times per week. Drug: Marijuana Garon Marin).    FamHx: Father with premature cad  Allergies: Methimazole   ROS:   Review of Systems   Constitutional:  Positive for fatigue. Respiratory:  Negative for shortness of breath. All other systems reviewed and are negative. MEDICATIONS      Prior to Admission medications    Medication Sig Start Date End Date Taking? Authorizing Provider   tadalafil (CIALIS) 20 MG tablet as needed 9/3/22  Yes Historical Provider, MD   magnesium oxide (MAG-OX) 400 (240 Mg) MG tablet Take 1 tablet by mouth daily 10/8/22  Yes Sharee Horvath MD   b complex vitamins capsule Take 1 capsule by mouth daily   Yes Historical Provider, MD   aspirin 81 MG EC tablet Take 81 mg by mouth daily   Yes Historical Provider, MD   ascorbic acid (VITAMIN C) 250 MG tablet Take 250 mg by mouth daily   Yes Historical Provider, MD   vitamin D3 (CHOLECALCIFEROL) 10 MCG (400 UNIT) TABS tablet Take 400 Units by mouth daily   Yes Historical Provider, MD   atorvastatin (LIPITOR) 80 MG tablet 1 at bed for LDL & heart. 2/24/14  Yes Cate Ivory MD   omeprazole (PRILOSEC) 20 MG delayed release capsule Take 20 mg by mouth daily. Yes Historical Provider, MD   Cyanocobalamin (B-12) 1000 MCG CAPS Take  by mouth.    Yes Historical Provider, MD   famotidine (PEPCID) 20 MG tablet Take 1 tablet by mouth daily  Patient not taking: Reported on 11/10/2022 10/13/22   Tati Nascimento MD   metoprolol succinate (TOPROL XL) 25 MG extended release tablet TAKE 1 TABLET BY MOUTH IN THE MORNING  Patient not taking: Reported on 11/10/2022 9/21/22   Tati Nascimento MD       PHYSICAL EXAM        Vitals:    11/10/22 0914   BP: 138/80   Pulse: 67   SpO2: 97%    Weight: 167 lb 12.8 oz (76.1 kg)     Gen Alert, cooperative, no distress Heart  Regular rate and rhythm, 2/6 murmur   Head Normocephalic, atraumatic, no abnormalities Abd  Soft, NT, +BS, no mass, no OM   Eyes PERRLA, conj/corn clear Ext  Ext nl, AT, no C/C, no edema   Nose Nares normal, no drain age, Non-tender Pulse 2+ and symmetric   Throat Lips, mucosa, tongue normal Skin Color/text/turg nl, no rash/lesions   Neck S/S, TM, NT, no bruit Psych Nl mood and affect   Lung CTA-B, unlabored, no DTP     Ch wall NT, no deform       LABS and Imaging     Relevant and available CV data reviewed  Echo/MRI: 2015The left ventricle is normal in size. There is normal left   ventricular wall thickness. The left ventricular function is   moderately reduced. Overall left ventricular ejection fraction is   estimated to be 40-45%. Septal motion is consistent with conduction   abnormality. The diastolic function is impaired and classified as   Grade 1 (impaired relaxation). Cath: none  Holter:none  EKG personally interpreted: LBBB  Stress:[facility]  Moderate Risk  Moderate Complexity/Medical Decision Making  Outside/Care everywhere records Reviewed  Labs Reviewed  Prior Imaging, ekg, cath, echo reviewed when available  Medications reviewed  Old Notes reviewed  ASSESSMENT AND PLAN      LBBB  -      new problem  -      ascvd risk score 5.7%  -      shortness of breath  -     previous echo with ef 45%   Plan:  -      repeat echo, will need ischemia evaluation. Cta or cath depending on results of echo  - continue metoprolol succinate    2. Alcohol use disorder  -     has cut back  - new problem  Plan  -     recommend avoidance of alcohol    3. Essential Hypertension  -   10/7   k 4.0 creat 1.2  -    138/80  - above goal  Plan:  -    toprol 25 mg daily      4. Mixed hyperlipidemia  -    6/7/21  tc 168  hdl 69 ldl 99 tri 156  - above goal  Plan:  -    continue lipitor 80 mg daily    5. GERD  -     stable  Plan  -    pepcid 20 mg daily    6. Low magnesium  -   10/6/22  mag 1.0  -   10/7  1.8  Plan  -   mag 400 mg daily  Patient counseled on lifestyle modification, diet, and exercise.     Follow Up:   3 months    Dr. Odin Kendrick:  Kelly BRADFORD am scribing for and in the presence of Gwenith Fear 11/10/22 9:40 AM         Physician Attestation  The scribe for and in the presence of tone Collins DO). The scribe Jorge Bragg RN  may have prepopulated components of this note with my historical  intellectual property under my direct supervision. Any additions to this intellectual property were performed in my presence and at my direction. Furthermore, the content and accuracy of this note have been reviewed by tone Collins DO).   11/10/2022 9:40 AM

## 2022-11-10 ENCOUNTER — OFFICE VISIT (OUTPATIENT)
Dept: CARDIOLOGY CLINIC | Age: 57
End: 2022-11-10
Payer: COMMERCIAL

## 2022-11-10 VITALS
SYSTOLIC BLOOD PRESSURE: 138 MMHG | WEIGHT: 167.8 LBS | HEART RATE: 67 BPM | HEIGHT: 68 IN | DIASTOLIC BLOOD PRESSURE: 80 MMHG | OXYGEN SATURATION: 97 % | BODY MASS INDEX: 25.43 KG/M2

## 2022-11-10 DIAGNOSIS — K21.9 GASTROESOPHAGEAL REFLUX DISEASE, UNSPECIFIED WHETHER ESOPHAGITIS PRESENT: ICD-10-CM

## 2022-11-10 DIAGNOSIS — I10 ESSENTIAL HYPERTENSION: ICD-10-CM

## 2022-11-10 DIAGNOSIS — E78.2 MIXED HYPERLIPIDEMIA: ICD-10-CM

## 2022-11-10 DIAGNOSIS — Z78.9 ALCOHOL USE: ICD-10-CM

## 2022-11-10 DIAGNOSIS — I44.7 LBBB (LEFT BUNDLE BRANCH BLOCK): Primary | ICD-10-CM

## 2022-11-10 DIAGNOSIS — I42.9 CARDIOMYOPATHY, UNSPECIFIED TYPE (HCC): ICD-10-CM

## 2022-11-10 PROCEDURE — 99204 OFFICE O/P NEW MOD 45 MIN: CPT | Performed by: INTERNAL MEDICINE

## 2022-11-10 PROCEDURE — 3074F SYST BP LT 130 MM HG: CPT | Performed by: INTERNAL MEDICINE

## 2022-11-10 PROCEDURE — 3078F DIAST BP <80 MM HG: CPT | Performed by: INTERNAL MEDICINE

## 2022-11-10 NOTE — LETTER
[address] [place] [address] - [facility] [address]  [address] [address]  Phone: [phone]  Fax: [address], DO    November 20, 2022     Brandt President, [address] [address]    Patient: Vale Hoskins   MR Number: [id number]   YOB: 1965   Date of Visit: 11/10/2022       Dear Brandt President:    Thank you for referring Vale Hoskins to me for evaluation/treatment. Below are the relevant portions of my assessment and plan of care. If you have questions, please do not hesitate to call me. I look forward to following Jackie Flaco along with you.     Sincerely,      Ariadna Arora, DO

## 2022-11-17 ENCOUNTER — PROCEDURE VISIT (OUTPATIENT)
Dept: CARDIOLOGY CLINIC | Age: 57
End: 2022-11-17
Payer: COMMERCIAL

## 2022-11-17 DIAGNOSIS — I44.7 LBBB (LEFT BUNDLE BRANCH BLOCK): ICD-10-CM

## 2022-11-17 DIAGNOSIS — I42.9 CARDIOMYOPATHY, UNSPECIFIED TYPE (HCC): ICD-10-CM

## 2022-11-17 LAB
LV EF: 48 %
LVEF MODALITY: NORMAL

## 2022-11-18 PROCEDURE — 93306 TTE W/DOPPLER COMPLETE: CPT | Performed by: INTERNAL MEDICINE

## 2022-11-20 ASSESSMENT — ENCOUNTER SYMPTOMS: SHORTNESS OF BREATH: 0

## 2022-11-23 DIAGNOSIS — I44.7 LBBB (LEFT BUNDLE BRANCH BLOCK): Primary | ICD-10-CM

## 2022-12-05 ENCOUNTER — OFFICE VISIT (OUTPATIENT)
Dept: PRIMARY CARE CLINIC | Age: 57
End: 2022-12-05
Payer: COMMERCIAL

## 2022-12-05 VITALS
OXYGEN SATURATION: 96 % | HEART RATE: 88 BPM | HEIGHT: 68 IN | RESPIRATION RATE: 16 BRPM | TEMPERATURE: 97.7 F | BODY MASS INDEX: 26.83 KG/M2 | DIASTOLIC BLOOD PRESSURE: 90 MMHG | WEIGHT: 177 LBS | SYSTOLIC BLOOD PRESSURE: 146 MMHG

## 2022-12-05 DIAGNOSIS — I10 PRIMARY HYPERTENSION: Primary | ICD-10-CM

## 2022-12-05 DIAGNOSIS — M87.052 AVASCULAR NECROSIS OF BONES OF BOTH HIPS (HCC): ICD-10-CM

## 2022-12-05 DIAGNOSIS — F10.10 ALCOHOL ABUSE: ICD-10-CM

## 2022-12-05 DIAGNOSIS — M87.051 AVASCULAR NECROSIS OF BONES OF BOTH HIPS (HCC): ICD-10-CM

## 2022-12-05 DIAGNOSIS — E78.5 DYSLIPIDEMIA: ICD-10-CM

## 2022-12-05 DIAGNOSIS — I44.7 LBBB (LEFT BUNDLE BRANCH BLOCK): ICD-10-CM

## 2022-12-05 DIAGNOSIS — K21.9 GASTROESOPHAGEAL REFLUX DISEASE WITHOUT ESOPHAGITIS: ICD-10-CM

## 2022-12-05 PROCEDURE — 3074F SYST BP LT 130 MM HG: CPT | Performed by: FAMILY MEDICINE

## 2022-12-05 PROCEDURE — 3078F DIAST BP <80 MM HG: CPT | Performed by: FAMILY MEDICINE

## 2022-12-05 PROCEDURE — 99214 OFFICE O/P EST MOD 30 MIN: CPT | Performed by: FAMILY MEDICINE

## 2022-12-05 RX ORDER — METOPROLOL SUCCINATE 25 MG/1
25 TABLET, EXTENDED RELEASE ORAL DAILY
Qty: 90 TABLET | Refills: 1 | Status: SHIPPED | OUTPATIENT
Start: 2022-12-05

## 2022-12-05 RX ORDER — ATORVASTATIN CALCIUM 80 MG/1
80 TABLET, FILM COATED ORAL DAILY
Qty: 90 TABLET | Refills: 1 | Status: SHIPPED | OUTPATIENT
Start: 2022-12-05

## 2022-12-05 ASSESSMENT — ENCOUNTER SYMPTOMS
SORE THROAT: 0
ABDOMINAL PAIN: 0
VOMITING: 0
DIARRHEA: 0
NAUSEA: 0
BLOOD IN STOOL: 0
COUGH: 0
SHORTNESS OF BREATH: 0

## 2022-12-05 NOTE — PROGRESS NOTES
Chief Complaint   Patient presents with    Hypertension     Pt here for follow up visit - no new concerns         Linnea Wilson is a 61 y.o. male who presents for routine visit regarding GERD, HTN, dyslipidemia and LBBB. Pt did see Cardiology regarding his LBBB and had 2D Echo done (see report below) and is scheduled for a followup visit in 2 weeks    Pt had recurrent indigestion on Rx pepcid medication and thus resumed his OTC prilosec 20 mg daily with relief and is taking an OTC magnesium supplement    Pt did see Ortho regarding his hip avascular necrosis and had MRI hips done and plan is for radiologic surveillance as pt is w/o any pain      Pt has a hx of HTN and dyslipidemia and is on lipitor, lopressor and a Baby ASA     Pt denies any hx of CAD or diabetes     Pt has a hx of chronic anemia and is followed by Hematology     Pt had a colonoscopy 12/30/2021 which was normal except for several benign polyps and mild diverticulosis     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 Pt smokes marijuana daily     FHx positive for alcoholism and early CAD(father) and glaucoma (mother)     2D Echo 11/17/2022  Summary   -Limited parasternal views secondary to lung interface.   -Normal left ventricle size, wall thickness and mildly reduced systolic   function with an estimated ejection fraction of 45-50%.    -Abnormal (paradoxical) septal motion is present likely due to left bundle   branch block.   -There is reversal of E/A inflow velocities across the mitral valve. -Impaired relaxation compatible with grade I diastolic   dysfunction. E/e\"=8.75.   -GLS -17.1% (borderline normal). -Mild mitral regurgitation.   -Trivial tricuspid regurgitation.   -Unable to estimate pulmonary artery pressure secondary to incomplete TR jet   envelope. Review of Systems   Constitutional:  Negative for fatigue and fever. HENT:  Positive for congestion (and took OTC zyrtec with some relief). Negative for nosebleeds and sore throat. Eyes:         Positive blindness in L eye   Respiratory:  Negative for cough and shortness of breath. Cardiovascular:  Negative for chest pain, palpitations and leg swelling. Gastrointestinal:  Negative for abdominal pain, blood in stool, diarrhea, nausea and vomiting. Negative melena or indigestion   Endocrine: Negative for polydipsia and polyuria. Genitourinary:  Negative for dysuria and hematuria. Musculoskeletal:  Positive for neck pain (at times from DDD). Negative for arthralgias. Skin:  Negative for rash. Neurological:  Negative for dizziness, seizures, syncope, speech difficulty, weakness and headaches. Psychiatric/Behavioral:  Negative for dysphoric mood. The patient is not nervous/anxious. Vitals:    12/05/22 0801   BP: (!) 146/90   Pulse:    Resp:    Temp:    SpO2:          Physical Exam  Vitals reviewed. Constitutional:       General: He is not in acute distress. HENT:      Mouth/Throat:      Mouth: Mucous membranes are moist.      Pharynx: Oropharynx is clear. Eyes:      General: No scleral icterus. Comments: Pink conjunctivae    Neck:      Thyroid: No thyromegaly. Comments: No carotid bruits noted B/L  Cardiovascular:      Heart sounds: Normal heart sounds. No murmur heard. No friction rub. No gallop. Pulmonary:      Effort: Pulmonary effort is normal.      Breath sounds: Normal breath sounds. Abdominal:      Palpations: Abdomen is soft. Tenderness: There is no abdominal tenderness. Musculoskeletal:      Comments: No leg edema noted B/L   Lymphadenopathy:      Cervical: No cervical adenopathy. Skin:     Findings: No rash. Neurological:      Mental Status: He is alert. Comments: Cranial nerves 2 - 12 grossly intact; Muscle strength 5/5 throughout   Psychiatric:         Mood and Affect: Mood normal.       ASSESSMENT AND PLAN    1. LBBB (left bundle branch block)  Pt with recent 2D Echo which showed mildly reduced systolic   function with an estimated ejection fraction of 45-50% along with grade I diastolic dysfunction and will await further input from Cardiology. Pt is with clear lung fields and has no leg edema on PE    2. Gastroesophageal reflux disease without esophagitis  Patient clinically stable on PPI medication and is on a magnesium supplement and has a nontender abdomen on PE    3. Alcohol abuse  Pt has decreased his alcohol use to 2 beers twice a week    4. Dyslipidemia  Patient clinically stable on present medications and is with a nonfocal neuro exam  - atorvastatin (LIPITOR) 80 MG tablet; Take 1 tablet by mouth daily  Dispense: 90 tablet; Refill: 1    5. Avascular necrosis of bones of both hips ([address]  Patient is followed by Ortho and plan is for radiologic surveillance given that pt is w/o any pain    6. Primary hypertension  Patient clinically stable on present medications and denies any CP or SOB   - metoprolol succinate (TOPROL XL) 25 MG extended release tablet; Take 1 tablet by mouth daily  Dispense: 90 tablet; Refill: 1      Arnulfo Narvaez MD      Return in about 3 months (around 3/5/2023). Patient Instructions     Go to the ER ASAP if you develop any chest pain, shortness of breath, facial droop, slurred speech, weakness/numbness in your arms or legs or double vision!

## 2022-12-15 DIAGNOSIS — I10 PRIMARY HYPERTENSION: ICD-10-CM

## 2022-12-15 RX ORDER — METOPROLOL SUCCINATE 25 MG/1
TABLET, EXTENDED RELEASE ORAL
Qty: 30 TABLET | OUTPATIENT
Start: 2022-12-15

## 2023-01-24 ENCOUNTER — HOSPITAL ENCOUNTER (OUTPATIENT)
Dept: CT IMAGING | Age: 58
Discharge: HOME | End: 2023-01-24
Payer: COMMERCIAL

## 2023-01-24 VITALS — SYSTOLIC BLOOD PRESSURE: 149 MMHG | DIASTOLIC BLOOD PRESSURE: 67 MMHG | HEART RATE: 74 BPM | OXYGEN SATURATION: 94 %

## 2023-01-24 DIAGNOSIS — I44.7 LBBB (LEFT BUNDLE BRANCH BLOCK): ICD-10-CM

## 2023-01-24 PROCEDURE — 6370000000 HC RX 637 (ALT 250 FOR IP): Performed by: RADIOLOGY

## 2023-01-24 PROCEDURE — 75574 CT ANGIO HRT W/3D IMAGE: CPT

## 2023-01-24 PROCEDURE — 6360000004 HC RX CONTRAST MEDICATION: Performed by: INTERNAL MEDICINE

## 2023-01-24 RX ORDER — NITROGLYCERIN 0.4 MG/1
0.4 TABLET SUBLINGUAL ONCE
Status: COMPLETED | OUTPATIENT
Start: 2023-01-24 | End: 2023-01-24

## 2023-01-24 RX ADMIN — NITROGLYCERIN 0.4 MG: 0.4 TABLET SUBLINGUAL at 12:54

## 2023-01-24 RX ADMIN — IOPAMIDOL 80 ML: 755 INJECTION, SOLUTION INTRAVENOUS at 13:13

## 2023-01-24 NOTE — PROGRESS NOTES
Pt here for Cardiac CTA test.  Administered 0 of metoprolol to achieve desired heart rate of 60 BPM.  Administered 0.4mg nitroglycerin sublingual for exam.  Pt tolerated well. VSS. See flow sheet. Pt transferred to back home.

## 2023-01-25 ENCOUNTER — TELEPHONE (OUTPATIENT)
Dept: CARDIOLOGY CLINIC | Age: 58
End: 2023-01-25

## 2023-01-25 DIAGNOSIS — I25.83 CORONARY ARTERY DISEASE DUE TO LIPID RICH PLAQUE: ICD-10-CM

## 2023-01-25 DIAGNOSIS — I25.10 CORONARY ARTERY DISEASE DUE TO LIPID RICH PLAQUE: ICD-10-CM

## 2023-01-25 DIAGNOSIS — I44.7 LBBB (LEFT BUNDLE BRANCH BLOCK): Primary | ICD-10-CM

## 2023-01-25 DIAGNOSIS — I42.9 CARDIOMYOPATHY, UNSPECIFIED TYPE (HCC): ICD-10-CM

## 2023-01-25 NOTE — TELEPHONE ENCOUNTER
Regarding: Abdirizak Aguilar  ----- Message from Roscoe Mays LPN sent at 7/40/4475 12:04 PM EST -----       ----- Message from Dusty Villarreal \"Kendrick\" to Lorin Lassiter DO sent at 1/25/2023  9:55 AM -----   Dr Prema Miranda should I schedule a coronary catheter angiogram prior to Feb 1 appointment?

## 2023-01-25 NOTE — TELEPHONE ENCOUNTER
Called patient with results, agrees to following up with angiogram. Instructions given, including to come in 4 hours prior cath  Sierra  Can you please call and arrange a cath with dkw and pt ( orders placed)  He will need fluids 4 hours prior  He should keep his appt with dkw 2/1, and get renal panel prior office visit ( patient is aware)

## 2023-01-26 NOTE — TELEPHONE ENCOUNTER
Pt is scheduled on 2-3-23/9:30-11:00 am w/ALIYAH. All preps reviewed and copy placed in [email or web address].

## 2023-01-30 ENCOUNTER — TELEPHONE (OUTPATIENT)
Dept: CARDIOLOGY CLINIC | Age: 58
End: 2023-01-30

## 2023-01-30 NOTE — TELEPHONE ENCOUNTER
Patient is calling in stated that our office is no longer cover under his insurance and that he is not sure what to do because he don't want to pay for the procedure and the office visit with [address] [place] [address].

## 2023-01-30 NOTE — TELEPHONE ENCOUNTER
I spoke to Mirella Dick Flores again and went over this information. I reminded him we do not have access to his out of pocket costs but he states he saw it on [employer]. I confirmed his OV and cath for this week.

## 2023-01-30 NOTE — TELEPHONE ENCOUNTER
He has an OV scheduled 2/1/23 and a cath scheduled 2/3/23. According to our procedure , the cath is approved. I spoke to  Gary Jenniffer. He said he received a letter from [facility] 1/28/23 stating he is no longer covered under [facility] after 1/31/23; the letter was dated 1/19/23. I then spoke to Lanre Aguilar in [facility]. She said she received the approval today from 1/26/23 thru 7/25/23. She told me [place] [facility] [place] needed to [facility] their contract, and it occurred mid-January; [facility] continues to accept [facility].

## 2023-01-31 PROBLEM — I25.83 CORONARY ARTERY DISEASE DUE TO LIPID RICH PLAQUE: Status: ACTIVE | Noted: 2023-01-31

## 2023-01-31 PROBLEM — I25.10 CORONARY ARTERY DISEASE DUE TO LIPID RICH PLAQUE: Status: ACTIVE | Noted: 2023-01-31

## 2023-01-31 ASSESSMENT — ENCOUNTER SYMPTOMS: SHORTNESS OF BREATH: 0

## 2023-01-31 NOTE — PATIENT INSTRUCTIONS
Increase metoprolol to twice a day  Labs today- cbc and renal panel  Please get a New blood pressure cuff  Follow up after cath

## 2023-01-31 NOTE — PROGRESS NOTES
[address]  23  Referring: Zonia Suresh CONSULT/CHIEF COMPLAINT/HPI     Reason for visit/ Chief complaint  Follow up   LBB   HPI Jayleen Mei is a 60 y.o. seen as a follow up with Dr Francisco Moreland for LBBB (diagnosed ), hypertension, hyperlipidemia, GERD. He is legally blind in his left eye secondary to central retinal ocular occlusion. Suffers from alcohol abuse, avascular necrosis. Grandfather  in  on the cath lab table    Michael Mccarthy quit smoking in 2016  (1 ppd x 30 years) smokes marijuana daily, drinks couple beers a day. Was a heavy drinker for 30 years. Father had early cad ( diagnosed in his 42's smoked 3ppd, alcohol), mother glaucoma. Maternal aunt had a valve replacement. He is , works as a .  He has a three year old [place]. Enjoys [place], walking the dog and biking. In the [facility] his last visit he had an echo that showed EF 45-50%. And coronary CTA that showed extensive plaque. He is scheduled for an angiogram  ( come in 4 hours prior for hydration) today was going to have a renal panel   He has been checking his blood pressure at home, and it is higher at home. She has no chest pain, shortness of breath palpitations or dizziness. No bleeding, blood in stool, concussion. Not anticipating any upcoming surgery    Patient is adherent with medications and is tolerating them well without side effects     HISTORY/ALLERGIES/ROS     MedHx:  has a past medical history of ED (erectile dysfunction), FH: CAD (coronary artery disease), GERD (gastroesophageal reflux disease), HLD (hyperlipidemia), HTN (hypertension), and MTHFR mutation. SurgHx:  has a past surgical history that includes Skin cancer excision (2015); Uvulectomy (); Spine surgery; and [place] tooth extraction. SocHx:  reports that he quit smoking about 8 years ago. His smoking use included cigarettes. He has a 30.00 pack-year smoking history.  He has never used smokeless tobacco. He reports current alcohol use of about 14.0 standard drinks per week. He reports current drug use. Frequency: 7.00 times per week. Drug: Marijuana Shellye Burkitt). FamHx: Father with premature cad  Allergies: Methimazole   ROS:   Review of Systems   Constitutional:  Positive for fatigue. Respiratory:  Negative for shortness of breath. All other systems reviewed and are negative. MEDICATIONS      Prior to Admission medications    Medication Sig Start Date End Date Taking? Authorizing Provider   atorvastatin (LIPITOR) 80 MG tablet Take 1 tablet by mouth daily 12/5/22  Yes Kori Gonzalez MD   metoprolol succinate (TOPROL XL) 25 MG extended release tablet Take 1 tablet by mouth daily 12/5/22  Yes Kori Gonzalez MD   tadalafil (CIALIS) 20 MG tablet as needed 9/3/22  Yes Historical Provider, MD   magnesium oxide (MAG-OX) 400 (240 Mg) MG tablet Take 1 tablet by mouth daily 10/8/22  Yes Isrrael Seay MD   b complex vitamins capsule Take 1 capsule by mouth daily   Yes Historical Provider, MD   aspirin 81 MG EC tablet Take 81 mg by mouth daily   Yes Historical Provider, MD   ascorbic acid (VITAMIN C) 250 MG tablet Take 250 mg by mouth daily   Yes Historical Provider, MD   vitamin D3 (CHOLECALCIFEROL) 10 MCG (400 UNIT) TABS tablet Take 400 Units by mouth daily   Yes Historical Provider, MD   omeprazole (PRILOSEC) 20 MG delayed release capsule Take 20 mg by mouth daily. Yes Historical Provider, MD   Cyanocobalamin (B-12) 1000 MCG CAPS Take  by mouth.    Yes Historical Provider, MD       PHYSICAL EXAM        Vitals:    02/01/23 0920   BP: (!) 140/80   Pulse: 67   SpO2: 97%    Weight: 176 lb (79.8 kg)     Gen Alert, cooperative, no distress Heart  Regular rate and rhythm, 2/6 murmur   Head Normocephalic, atraumatic, no abnormalities Abd  Soft, NT, +BS, no mass, no OM   Eyes PERRLA, conj/corn clear Ext  Ext nl, AT, no C/C, no edema   Nose Nares normal, no drain age, Non-tender Pulse 2+ and symmetric   Throat Lips, mucosa, tongue normal Skin Color/text/turg nl, no rash/lesions   Neck S/S, TM, NT, no bruit Psych Nl mood and affect   Lung CTA-B, unlabored, no DTP     Ch wall NT, no deform       LABS and Imaging     Relevant and available CV data reviewed  Echo/MRI: -Limited parasternal views secondary to lung interface.   -Normal left ventricle size, wall thickness and mildly reduced systolic   function with an estimated ejection fraction of 45-50%. -Abnormal (paradoxical) septal motion is present likely due to left bundle   branch block.   -There is reversal of E/A inflow velocities across the mitral valve. -Impaired relaxation compatible with grade I diastolic   dysfunction. E/e\"=8.75.   -GLS -17.1% (borderline normal). -Mild mitral regurgitation.   -Trivial tricuspid regurgitation.   -Unable to estimate pulmonary artery pressure secondary to incomplete TR jet   envelope. 1/24/23-  1. Extensive coronary calcific plaque, with patient's total coronary calcium score 748. This is greater than 90th percentile for male patients between the ages of 48 and 61 years. 2. The presence of calcific plaque throughout the coronary arteries does introduce some blooming phenomenon which may overstate the degree of coronary stenosis. However, there does appear to be extensive plaque throughout the distal left main artery as    well as the LAD, circumflex, and dominant right coronary arteries as described. 50-60% diameter stenosis is suggested within the mid portions of the left circumflex artery. There is less than 50% diameter stenosis in the right coronary artery. There is    50-60% diameter stenosis in the proximal LAD, and estimated 60% diameter stenosis in the distal left main near the bifurcation into LAD and circumflex arteries. These findings correspond to CAD RADS category 4b. Given the left main artery involvement,    correlation with dedicated coronary catheter angiography is recommended.       Cath: none  Holter:none  EKG personally interpreted: LBBB  Stress:[address] [facility]  High Risk given life threatening problem   High Complexity/Medical Decision Making, need for invasive procedure  Outside/Care everywhere records Reviewed  Labs Reviewed  Prior Imaging, ekg, cath, echo reviewed when available  Medications reviewed  Old Notes reviewed  ASSESSMENT AND PLAN     1. Coronary artery disease  - LBBB  -new problem  -ascvd risk score 5.7%  -shortness of breath  -previous echo with ef 45%   - 2022 echo EF 45-50%  1/24/23 card  score- extensive plaque  - new problem  Plan:  - cardiac cath scheduled on Friday  - continue metoprolol succinate- increase to twice a day  Discussed the risks, benefits, and alternatives, including damage to nerves/arteries/veins/surrounding structures, stroke, heart attack, death, bleeding requiring transfusion, open heart surgery, pacemaker, paralysis, loss of limb, anaphylaxis, respiratory failure, and renal failure requiring dialysis. Patient voiced understanding and was given the opportunity to ask questions and demonstrated insight. 2.Ischemic cardiomyopathy  - EF 45%  - new problem  Problem  - cath to evaluate    3. Essential Hypertension  -10/7   k 4.0 creat 1.2  -140/80  - 148/94 (bp cuff in room) 171/110 ( home cuff)  - above goal  Plan:  -increase toprol to bid  -recommend he get a new blood pressure cuff      4. Mixed hyperlipidemia  - 6/7/21  tc 168  hdl 69 ldl 99 tri 156  - above goal  Plan:  -continue lipitor 80 mg daily    5. GERD  -stable  Plan  -pepcid 20 mg daily    6. Low magnesium  - 10/6/22  mag 1.0  - 10/7  1.8  -stable  Plan  -mag 400 mg daily  Patient counseled on lifestyle modification, diet, and exercise. Follow Up:    After cath    Dr. Darwin Nielson:  I, Nona Carreon, am scribing for and in the presence of Arabella Nielson 02/01/23 9:37 AM         Physician Attestation  The scribe for and in the presence of me (Toni Jasso DO).  The scribe Iris Hdez RN  may have prepopulated components of this note with my historical  intellectual property under my direct supervision.  Any additions to this intellectual property were performed in my presence and at my direction.  Furthermore, the content and accuracy of this note have been reviewed by me (Toni Jasso DO).  2/1/2023 9:37 AM

## 2023-02-01 ENCOUNTER — OFFICE VISIT (OUTPATIENT)
Dept: CARDIOLOGY CLINIC | Age: 58
End: 2023-02-01

## 2023-02-01 ENCOUNTER — HOSPITAL ENCOUNTER (OUTPATIENT)
Age: 58
Discharge: HOME | End: 2023-02-01
Payer: COMMERCIAL

## 2023-02-01 VITALS
SYSTOLIC BLOOD PRESSURE: 148 MMHG | HEART RATE: 67 BPM | DIASTOLIC BLOOD PRESSURE: 94 MMHG | BODY MASS INDEX: 26.07 KG/M2 | HEIGHT: 69 IN | OXYGEN SATURATION: 97 % | WEIGHT: 176 LBS

## 2023-02-01 DIAGNOSIS — E78.2 MIXED HYPERLIPIDEMIA: ICD-10-CM

## 2023-02-01 DIAGNOSIS — I10 ESSENTIAL HYPERTENSION: ICD-10-CM

## 2023-02-01 DIAGNOSIS — Z78.9 ALCOHOL USE: ICD-10-CM

## 2023-02-01 DIAGNOSIS — I25.83 CORONARY ARTERY DISEASE DUE TO LIPID RICH PLAQUE: Primary | ICD-10-CM

## 2023-02-01 DIAGNOSIS — I42.9 CARDIOMYOPATHY, UNSPECIFIED TYPE (HCC): ICD-10-CM

## 2023-02-01 DIAGNOSIS — I25.5 ISCHEMIC CARDIOMYOPATHY: ICD-10-CM

## 2023-02-01 DIAGNOSIS — I25.10 CORONARY ARTERY DISEASE DUE TO LIPID RICH PLAQUE: ICD-10-CM

## 2023-02-01 DIAGNOSIS — I44.7 LBBB (LEFT BUNDLE BRANCH BLOCK): ICD-10-CM

## 2023-02-01 DIAGNOSIS — I25.83 CORONARY ARTERY DISEASE DUE TO LIPID RICH PLAQUE: ICD-10-CM

## 2023-02-01 DIAGNOSIS — I25.10 CORONARY ARTERY DISEASE DUE TO LIPID RICH PLAQUE: Primary | ICD-10-CM

## 2023-02-01 DIAGNOSIS — K21.9 GASTROESOPHAGEAL REFLUX DISEASE, UNSPECIFIED WHETHER ESOPHAGITIS PRESENT: ICD-10-CM

## 2023-02-01 LAB
ALBUMIN SERPL-MCNC: 4.4 G/DL (ref 3.4–5)
ANION GAP SERPL CALCULATED.3IONS-SCNC: 13 MMOL/L (ref 3–16)
BASOPHILS ABSOLUTE: 0.1 K/UL (ref 0–0.2)
BASOPHILS RELATIVE PERCENT: 0.8 %
BUN BLDV-MCNC: 15 MG/DL (ref 7–20)
CALCIUM SERPL-MCNC: 9.5 MG/DL (ref 8.3–10.6)
CHLORIDE BLD-SCNC: 99 MMOL/L (ref 99–110)
CO2: 27 MMOL/L (ref 21–32)
CREAT SERPL-MCNC: 1.3 MG/DL (ref 0.9–1.3)
EOSINOPHILS ABSOLUTE: 0.2 K/UL (ref 0–0.6)
EOSINOPHILS RELATIVE PERCENT: 1.7 %
GFR SERPL CREATININE-BSD FRML MDRD: >60 ML/MIN/{1.73_M2}
GLUCOSE BLD-MCNC: 109 MG/DL (ref 70–99)
HCT VFR BLD CALC: 42.7 % (ref 40.5–52.5)
HEMOGLOBIN: 14.3 G/DL (ref 13.5–17.5)
LYMPHOCYTES ABSOLUTE: 1.8 K/UL (ref 1–5.1)
LYMPHOCYTES RELATIVE PERCENT: 14.8 %
MCH RBC QN AUTO: 31.1 PG (ref 26–34)
MCHC RBC AUTO-ENTMCNC: 33.5 G/DL (ref 31–36)
MCV RBC AUTO: 93.1 FL (ref 80–100)
MONOCYTES ABSOLUTE: 1 K/UL (ref 0–1.3)
MONOCYTES RELATIVE PERCENT: 8.6 %
NEUTROPHILS ABSOLUTE: 8.9 K/UL (ref 1.7–7.7)
NEUTROPHILS RELATIVE PERCENT: 74.1 %
PDW BLD-RTO: 12.8 % (ref 12.4–15.4)
PHOSPHORUS: 3 MG/DL (ref 2.5–4.9)
PLATELET # BLD: 220 K/UL (ref 135–450)
PMV BLD AUTO: 10.2 FL (ref 5–10.5)
POTASSIUM SERPL-SCNC: 4.3 MMOL/L (ref 3.5–5.1)
RBC # BLD: 4.59 M/UL (ref 4.2–5.9)
SODIUM BLD-SCNC: 139 MMOL/L (ref 136–145)
WBC # BLD: 11.9 K/UL (ref 4–11)

## 2023-02-01 PROCEDURE — 80069 RENAL FUNCTION PANEL: CPT

## 2023-02-01 PROCEDURE — 85025 COMPLETE CBC W/AUTO DIFF WBC: CPT

## 2023-02-01 PROCEDURE — 36415 COLL VENOUS BLD VENIPUNCTURE: CPT

## 2023-02-01 RX ORDER — METOPROLOL SUCCINATE 25 MG/1
25 TABLET, EXTENDED RELEASE ORAL 2 TIMES DAILY
Qty: 180 TABLET | Refills: 3 | Status: SHIPPED | OUTPATIENT
Start: 2023-02-01

## 2023-02-01 NOTE — LETTER
[facility]  [address] [address]  Phone: [phone]  Fax: [address], DO    February 5, 2023     Yoni Alex, [address] [id number]    Patient: Jayleen Mei   MR Number: [id number]   YOB: 1965   Date of Visit: 2/1/2023       Dear Yoni Alex:    Thank you for referring Morales Burn to me for evaluation/treatment. Below are the relevant portions of my assessment and plan of care. If you have questions, please do not hesitate to call me. I look forward to following Michael Mccarthy along with you.     Sincerely,      Eugenio Thakur, DO

## 2023-02-03 ENCOUNTER — HOSPITAL ENCOUNTER (INPATIENT)
Dept: CARDIAC CATH/INVASIVE PROCEDURES | Age: 58
LOS: 1 days | Discharge: HOME | DRG: 247 | End: 2023-02-04
Attending: INTERNAL MEDICINE | Admitting: INTERNAL MEDICINE
Payer: COMMERCIAL

## 2023-02-03 DIAGNOSIS — I44.7 LBBB (LEFT BUNDLE BRANCH BLOCK): ICD-10-CM

## 2023-02-03 DIAGNOSIS — I25.10 CORONARY ARTERY DISEASE DUE TO LIPID RICH PLAQUE: ICD-10-CM

## 2023-02-03 DIAGNOSIS — I25.83 CORONARY ARTERY DISEASE DUE TO LIPID RICH PLAQUE: ICD-10-CM

## 2023-02-03 DIAGNOSIS — I42.9 CARDIOMYOPATHY, UNSPECIFIED TYPE (HCC): ICD-10-CM

## 2023-02-03 LAB
EKG ATRIAL RATE: 61 BPM
EKG ATRIAL RATE: 66 BPM
EKG DIAGNOSIS: NORMAL
EKG DIAGNOSIS: NORMAL
EKG P AXIS: 56 DEGREES
EKG P AXIS: 58 DEGREES
EKG P-R INTERVAL: 170 MS
EKG P-R INTERVAL: 178 MS
EKG Q-T INTERVAL: 438 MS
EKG Q-T INTERVAL: 472 MS
EKG QRS DURATION: 144 MS
EKG QRS DURATION: 146 MS
EKG QTC CALCULATION (BAZETT): 459 MS
EKG QTC CALCULATION (BAZETT): 475 MS
EKG R AXIS: -38 DEGREES
EKG R AXIS: -54 DEGREES
EKG T AXIS: 20 DEGREES
EKG T AXIS: 67 DEGREES
EKG VENTRICULAR RATE: 61 BPM
EKG VENTRICULAR RATE: 66 BPM
POC ACT LR: 236 SEC
POC ACT LR: 245 SEC
POC ACT LR: 253 SEC
POC ACT LR: 256 SEC

## 2023-02-03 PROCEDURE — 92928 PRQ TCAT PLMT NTRAC ST 1 LES: CPT

## 2023-02-03 PROCEDURE — 6370000000 HC RX 637 (ALT 250 FOR IP): Performed by: INTERNAL MEDICINE

## 2023-02-03 PROCEDURE — 2580000003 HC RX 258

## 2023-02-03 PROCEDURE — 6360000002 HC RX W HCPCS: Performed by: INTERNAL MEDICINE

## 2023-02-03 PROCEDURE — 6A750ZZ ULTRASOUND THERAPY, CIRCULATORY, SINGLE: ICD-10-PCS | Performed by: INTERNAL MEDICINE

## 2023-02-03 PROCEDURE — C1725 CATH, TRANSLUMIN NON-LASER: HCPCS

## 2023-02-03 PROCEDURE — 4A023N7 MEASUREMENT OF CARDIAC SAMPLING AND PRESSURE, LEFT HEART, PERCUTANEOUS APPROACH: ICD-10-PCS | Performed by: INTERNAL MEDICINE

## 2023-02-03 PROCEDURE — 96366 THER/PROPH/DIAG IV INF ADDON: CPT

## 2023-02-03 PROCEDURE — 2709999900 HC NON-CHARGEABLE SUPPLY

## 2023-02-03 PROCEDURE — 92978 ENDOLUMINL IVUS OCT C 1ST: CPT

## 2023-02-03 PROCEDURE — C1894 INTRO/SHEATH, NON-LASER: HCPCS

## 2023-02-03 PROCEDURE — 2500000003 HC RX 250 WO HCPCS

## 2023-02-03 PROCEDURE — B2151ZZ FLUOROSCOPY OF LEFT HEART USING LOW OSMOLAR CONTRAST: ICD-10-PCS | Performed by: INTERNAL MEDICINE

## 2023-02-03 PROCEDURE — 93458 L HRT ARTERY/VENTRICLE ANGIO: CPT

## 2023-02-03 PROCEDURE — 96365 THER/PROPH/DIAG IV INF INIT: CPT

## 2023-02-03 PROCEDURE — B2111ZZ FLUOROSCOPY OF MULTIPLE CORONARY ARTERIES USING LOW OSMOLAR CONTRAST: ICD-10-PCS | Performed by: INTERNAL MEDICINE

## 2023-02-03 PROCEDURE — 93571 IV DOP VEL&/PRESS C FLO 1ST: CPT

## 2023-02-03 PROCEDURE — 93005 ELECTROCARDIOGRAM TRACING: CPT | Performed by: INTERNAL MEDICINE

## 2023-02-03 PROCEDURE — 2000000000 HC ICU R&B

## 2023-02-03 PROCEDURE — 027035Z DILATION OF CORONARY ARTERY, ONE ARTERY WITH TWO DRUG-ELUTING INTRALUMINAL DEVICES, PERCUTANEOUS APPROACH: ICD-10-PCS | Performed by: INTERNAL MEDICINE

## 2023-02-03 PROCEDURE — C1753 CATH, INTRAVAS ULTRASOUND: HCPCS

## 2023-02-03 PROCEDURE — 6360000004 HC RX CONTRAST MEDICATION: Performed by: INTERNAL MEDICINE

## 2023-02-03 PROCEDURE — 99153 MOD SED SAME PHYS/QHP EA: CPT

## 2023-02-03 PROCEDURE — 99152 MOD SED SAME PHYS/QHP 5/>YRS: CPT

## 2023-02-03 PROCEDURE — 85347 COAGULATION TIME ACTIVATED: CPT

## 2023-02-03 PROCEDURE — 93454 CORONARY ARTERY ANGIO S&I: CPT | Performed by: INTERNAL MEDICINE

## 2023-02-03 PROCEDURE — 6360000002 HC RX W HCPCS

## 2023-02-03 PROCEDURE — C1874 STENT, COATED/COV W/DEL SYS: HCPCS

## 2023-02-03 PROCEDURE — C1769 GUIDE WIRE: HCPCS

## 2023-02-03 PROCEDURE — G0378 HOSPITAL OBSERVATION PER HR: HCPCS

## 2023-02-03 PROCEDURE — C1887 CATHETER, GUIDING: HCPCS

## 2023-02-03 PROCEDURE — 6370000000 HC RX 637 (ALT 250 FOR IP)

## 2023-02-03 PROCEDURE — 99152 MOD SED SAME PHYS/QHP 5/>YRS: CPT | Performed by: INTERNAL MEDICINE

## 2023-02-03 PROCEDURE — 93010 ELECTROCARDIOGRAM REPORT: CPT | Performed by: INTERNAL MEDICINE

## 2023-02-03 PROCEDURE — 2580000003 HC RX 258: Performed by: INTERNAL MEDICINE

## 2023-02-03 RX ORDER — SODIUM CHLORIDE 0.9 % (FLUSH) 0.9 %
5-40 SYRINGE (ML) INJECTION PRN
Status: DISCONTINUED | OUTPATIENT
Start: 2023-02-03 | End: 2023-02-04 | Stop reason: HOSPADM

## 2023-02-03 RX ORDER — SODIUM CHLORIDE 9 MG/ML
INJECTION, SOLUTION INTRAVENOUS CONTINUOUS
Status: DISCONTINUED | OUTPATIENT
Start: 2023-02-03 | End: 2023-02-04 | Stop reason: HOSPADM

## 2023-02-03 RX ORDER — METOPROLOL SUCCINATE 25 MG/1
25 TABLET, EXTENDED RELEASE ORAL 2 TIMES DAILY
Status: DISCONTINUED | OUTPATIENT
Start: 2023-02-03 | End: 2023-02-04 | Stop reason: HOSPADM

## 2023-02-03 RX ORDER — LANOLIN ALCOHOL/MO/W.PET/CERES
400 CREAM (GRAM) TOPICAL DAILY
Qty: 90 TABLET | Refills: 3 | Status: SHIPPED | OUTPATIENT
Start: 2023-02-03

## 2023-02-03 RX ORDER — ASPIRIN 81 MG/1
81 TABLET, CHEWABLE ORAL DAILY
Qty: 90 TABLET | Refills: 3 | Status: SHIPPED | OUTPATIENT
Start: 2023-02-04

## 2023-02-03 RX ORDER — AMLODIPINE BESYLATE 5 MG/1
5 TABLET ORAL DAILY
Status: DISCONTINUED | OUTPATIENT
Start: 2023-02-03 | End: 2023-02-04 | Stop reason: HOSPADM

## 2023-02-03 RX ORDER — ASPIRIN 81 MG/1
81 TABLET, CHEWABLE ORAL DAILY
Status: DISCONTINUED | OUTPATIENT
Start: 2023-02-04 | End: 2023-02-04 | Stop reason: HOSPADM

## 2023-02-03 RX ORDER — SODIUM CHLORIDE 0.9 % (FLUSH) 0.9 %
5-40 SYRINGE (ML) INJECTION EVERY 12 HOURS SCHEDULED
Status: DISCONTINUED | OUTPATIENT
Start: 2023-02-03 | End: 2023-02-04 | Stop reason: HOSPADM

## 2023-02-03 RX ORDER — ATORVASTATIN CALCIUM 80 MG/1
80 TABLET, FILM COATED ORAL DAILY
Status: DISCONTINUED | OUTPATIENT
Start: 2023-02-04 | End: 2023-02-04 | Stop reason: HOSPADM

## 2023-02-03 RX ORDER — CLOPIDOGREL BISULFATE 75 MG/1
75 TABLET ORAL DAILY
Qty: 90 TABLET | Refills: 3 | Status: SHIPPED | OUTPATIENT
Start: 2023-02-04 | End: 2023-02-04 | Stop reason: SDUPTHER

## 2023-02-03 RX ORDER — ASPIRIN 81 MG/1
81 TABLET ORAL DAILY
Status: DISCONTINUED | OUTPATIENT
Start: 2023-02-03 | End: 2023-02-03

## 2023-02-03 RX ORDER — CLOPIDOGREL BISULFATE 75 MG/1
75 TABLET ORAL DAILY
Status: DISCONTINUED | OUTPATIENT
Start: 2023-02-04 | End: 2023-02-04 | Stop reason: HOSPADM

## 2023-02-03 RX ORDER — ACETAMINOPHEN 325 MG/1
650 TABLET ORAL EVERY 4 HOURS PRN
Status: DISCONTINUED | OUTPATIENT
Start: 2023-02-03 | End: 2023-02-04 | Stop reason: HOSPADM

## 2023-02-03 RX ORDER — SODIUM CHLORIDE 9 MG/ML
INJECTION, SOLUTION INTRAVENOUS PRN
Status: DISCONTINUED | OUTPATIENT
Start: 2023-02-03 | End: 2023-02-04 | Stop reason: HOSPADM

## 2023-02-03 RX ORDER — EPTIFIBATIDE 0.75 MG/ML
2 INJECTION, SOLUTION INTRAVENOUS CONTINUOUS
Status: ACTIVE | OUTPATIENT
Start: 2023-02-03 | End: 2023-02-03

## 2023-02-03 RX ORDER — HYDRALAZINE HYDROCHLORIDE 20 MG/ML
10 INJECTION INTRAMUSCULAR; INTRAVENOUS EVERY 6 HOURS PRN
Status: DISCONTINUED | OUTPATIENT
Start: 2023-02-03 | End: 2023-02-04 | Stop reason: HOSPADM

## 2023-02-03 RX ADMIN — EPTIFIBATIDE 2 MCG/KG/MIN: 0.75 INJECTION INTRAVENOUS at 13:03

## 2023-02-03 RX ADMIN — SODIUM CHLORIDE: 9 INJECTION, SOLUTION INTRAVENOUS at 22:35

## 2023-02-03 RX ADMIN — METOPROLOL SUCCINATE 25 MG: 25 TABLET, EXTENDED RELEASE ORAL at 20:44

## 2023-02-03 RX ADMIN — IOHEXOL 70 ML: 180 INJECTION INTRAVENOUS at 12:02

## 2023-02-03 RX ADMIN — IOHEXOL 65 ML: 240 INJECTION, SOLUTION INTRATHECAL; INTRAVASCULAR; INTRAVENOUS; ORAL at 12:31

## 2023-02-03 RX ADMIN — AMLODIPINE BESYLATE 5 MG: 5 TABLET ORAL at 13:00

## 2023-02-03 ASSESSMENT — PAIN SCALES - GENERAL: PAINLEVEL_OUTOF10: 0

## 2023-02-03 NOTE — CARE COORDINATION
Chart reviewed. Pt had cardiac cath. CM will follow for any discharge needs.       Trinity Ramirez RN, BSN  [phone]

## 2023-02-03 NOTE — H&P
Department of Cardiology Pre-operative History and Physical        DIAGNOSIS:  heart failure, shortness of breath, anginal equivalent    INDICATION:  heart failure, abnormal coronary cta, LBBB    PROCEDURE:  cardiac catheterization    CHIEF COMPLAINT: heart failure, shortness of breath, anginal equivalent    History Obtained From:  patient    HISTORY OF PRESENT ILLNESS:      The patient is a 60 y.o. male with significant past medical history of essential hypertension, hyperlipidemia who presents with shortness of breath and fatigue. He is a former smoker. He states that his symptoms worsen with exertion and improve with rest. He states that he is fatigued. Former smoker, family history of cad    Past Medical History:        Diagnosis Date    ED (erectile dysfunction)     FH: CAD (coronary artery disease)     GERD (gastroesophageal reflux disease)     HLD (hyperlipidemia)     HTN (hypertension)     MTHFR mutation     heterozygous; takes vitamins (B6, B12, folate)     Past Surgical History:        Procedure Laterality Date    SKIN CANCER EXCISION  02/2015    Melanoma on face    SPINE SURGERY      UVULECTOMY  2015    WISDOM TOOTH EXTRACTION       Medications Prior to Admission:   Medications Prior to Admission: metoprolol succinate (TOPROL XL) 25 MG extended release tablet, Take 1 tablet by mouth in the morning and at bedtime  atorvastatin (LIPITOR) 80 MG tablet, Take 1 tablet by mouth daily  tadalafil (CIALIS) 20 MG tablet, as needed  magnesium oxide (MAG-OX) 400 (240 Mg) MG tablet, Take 1 tablet by mouth daily  b complex vitamins capsule, Take 1 capsule by mouth daily  aspirin 81 MG EC tablet, Take 81 mg by mouth daily  ascorbic acid (VITAMIN C) 250 MG tablet, Take 250 mg by mouth daily  vitamin D3 (CHOLECALCIFEROL) 10 MCG (400 UNIT) TABS tablet, Take 400 Units by mouth daily  omeprazole (PRILOSEC) 20 MG delayed release capsule, Take 20 mg by mouth daily.   Cyanocobalamin (B-12) 1000 MCG CAPS, Take  by mouth. Allergies:  Methimazole    Social History:    TOBACCO:   reports that he quit smoking about 8 years ago. His smoking use included cigarettes. He has a 30.00 pack-year smoking history. He has never used smokeless tobacco.  Family History:        Problem Relation Age of Onset    Heart Attack Father     Heart Disease Father     Elevated Lipids Father     Hypertension Mother     Elevated Lipids Sister      REVIEW OF SYSTEMS:    Review of systems not obtained due to patient factors - 12 point ros negative except for hpi    PHYSICAL EXAM:  @FLOW[[id number]]@  VITALS:  BP (!) 179/103   Pulse 62   Resp 16   Ht 5' 8\" (1.727 m)   Wt 177 lb (80.3 kg)   SpO2 98%   BMI 26.91 kg/m²     Eyes:  Lids and lashes normal, pupils equal, round and reactive to light, extra ocular muscles intact, sclera clear, conjunctiva normal    Head/ENT:  Normocephalic, without obvious abnormality, atraumatic, sinuses nontender on palpation, external ears without lesions, oral pharynx with moist mucus membranes, tonsils without erythema or exudates, gums normal and good dentition. Neck:  Supple, symmetrical, trachea midline, no adenopathy, thyroid symmetric, not enlarged and no tenderness, skin normal    Heart:  Normal apical impulse, regular rate and rhythm, normal S1 and S2, no S3 or S4, and no murmur noted    Lungs:  No increased work of breathing, good air exchange, clear to auscultation bilaterally, no crackles or wheezing    Abdomen:  No scars, normal bowel sounds, soft, non-distended, non-tender, no masses palpated, no hepatosplenomegally    Extremities:  No clubbing, cyanosis, or edema            ASSESSMENT AND PLAN:    1. Patient is a 60 y.o. male with above specified procedure planned coronary angiogram, left heart cath with conscious sedation    2. Procedure options, risks and benefits reviewed with patient. Patient expresses understanding.

## 2023-02-03 NOTE — DISCHARGE INSTRUCTIONS
Radial Angiogram      Care of your puncture site:  Remove clear bandage 24 hours after the procedure. May shower in 24 hours  Inspect the site daily and gently clean using soap and water. Dry thoroughly and apply a Band-Aid. Normal Observations:  Soreness or tenderness which may last one week. Mild oozing from the incision site. Possible bruising that could last 2 weeks. Activity:  You may resume driving 24 hours following the procedure. You may resume normal activity in 3 days or after the wound heals. Avoid lifting more than 10 pounds for 3 days with affected arm. Nutrition:  Regular diet. Drink at least 8 to 10 glasses of decaffeinated, non-alcoholic fluid for the next 24 hours to flush the x-ray dye used for your angiogram out of your body. Call your doctor immediately if your condition worsens, for any other concerns, for a follow-up appointment or if you experience any of the following:  Significant bleeding that does not stop after 10 minutes of applying firm pressure on the puncture site. Increased swelling of the wrist.  Unusual pain, numbness, or tingling of the wrist/arm. Any signs of infection such as: redness, yellow drainage at the site, swelling or pain. Monitor home blood pressure. Goal is less than 140/80 but to remain greater than 100/50. Heart rate to remain >55 beats per minute. Create a log and bring to office visits.

## 2023-02-03 NOTE — SEDATION DOCUMENTATION
Brief Pre-Op Note/Sedation Assessment      Joel Mullen  1965  Cath/NONE      [id number]  10:00 AM    Planned Procedure: Cardiac Catheterization Procedure    Post Procedure Plan: Return to same level of care    Consent: I have discussed with the patient and/or the patient representative the indication, alternatives, and the possible risks and/or complications of the planned procedure and the anesthesia methods. The patient and/or patient representative appear to understand and agree to proceed. Discussed the risks, benefits, and alternatives, including damage to nerves/arteries/veins/surrounding structures, stroke, heart attack, death, bleeding requiring transfusion, open heart surgery, pacemaker, paralysis, loss of limb, anaphylaxis, respiratory failure, and renal failure requiring dialysis. Patient voiced understanding and was given the opportunity to ask questions and demonstrated insight. Chief Complaint: Anginal Equivalent  Dyspnea on Exertion  Dyspnea  Fatigue      Indications for Cath Procedure:  Suspected CAD, Cardiomyopathy, and LV Dysfunction  Anginal Classification within 2 weeks:  CCS III - Symptoms with everyday living activities, i.e., moderate limitation  NYHA Heart Failure Class within 2 weeks: Class III - Symptoms of HF on less-than-ordinary exertion, Newly Diagnosed? Yes, Heart Failure Type: Systolic  Is Cath Lab Visit Valve-related?: No  Surgical Risk: N/A  Functional Type: >= 4 METS with symptoms    Anti- Anginal Meds within 2 weeks:   Yes: Beta Blockers, Aspirin, and Statin (Any)    Stress or Imaging Studies Performed (within 6 months):  Cardiac CTA: Yes: Obstructive CAD      Vital Signs:  BP (!) 179/103   Pulse 62   Resp 16   Ht 5' 8\" (1.727 m)   Wt 177 lb (80.3 kg)   SpO2 98%   BMI 26.91 kg/m²     Allergies:   Allergies   Allergen Reactions    Methimazole Rash       Past Medical History:  Past Medical History:   Diagnosis Date    ED (erectile dysfunction)     FH: CAD (coronary artery disease)     GERD (gastroesophageal reflux disease)     HLD (hyperlipidemia)     HTN (hypertension)     MTHFR mutation     heterozygous; takes vitamins (B6, B12, folate)         Surgical History:  Past Surgical History:   Procedure Laterality Date    SKIN CANCER EXCISION  02/2015    Melanoma on face    SPINE SURGERY      UVULECTOMY  2015    WISDOM TOOTH EXTRACTION           Medications:  Current Facility-Administered Medications   Medication Dose Route Frequency Provider Last Rate Last Admin    sodium chloride flush 0.9 % injection 5-40 mL  5-40 mL IntraVENous PRN Arabella Johnson DO               Pre-Sedation:    Pre-Sedation Documentation and Exam:  I have personally completed a history, physical exam & review of systems for this patient (see notes). Prior History of Anesthesia Complications:   none    Modified Mallampati:  III (soft palate, base of uvula visible)    ASA Classification:  Class 2 - A normal healthy patient with mild systemic disease      Jania Scale: Activity:  2 - Able to move 4 extremities voluntarily on command  Respiration:  2 - Able to breathe deeply and cough freely  Circulation:  2 - BP+/- 20mmHg of normal  Consciousness:  2 - Fully awake  Oxygen Saturation (color):  2 - Able to maintain oxygen saturation >92% on room air    Sedation/Anesthesia Plan:  Guard the patient's safety and welfare. Minimize physical discomfort and pain. Minimize negative psychological responses to treatment by providing sedation and analgesia and maximize the potential amnesia. Patient to meet pre-procedure discharge plan.     Medication Planned:  midazolam intravenously and fentanyl intravenously    Patient is an appropriate candidate for plan of sedation: yes      Electronically signed by Nanci Dickinson DO on 2/3/2023 at 10:00 AM

## 2023-02-03 NOTE — FLOWSHEET NOTE
02/03/23 1241   Vitals   Temp 97.8 °F (36.6 °C)   Temp Source Temporal   Heart Rate 66   Heart Rate Source Monitor   Resp 18   /81   MAP (Calculated) 98   MAP (mmHg) 94   BP Location Right upper arm   BP Upper/Lower Upper   BP Method Automatic   Patient Position Semi fowlers   Level of Consciousness 0   MEWS Score 1   Cardiac Rhythm Sinus rhythm   Pain Assessment   Pain Assessment 0-10   Pain Level 0   Non-Pharmaceutical Pain Intervention(s) Repositioned   Response to Pain Intervention Patient satisfied   Oxygen Therapy   SpO2 100 %   Pulse Oximetry Type Intermittent   Pulse Oximeter Device Mode Intermittent   Pulse Oximeter Device Location Finger   O2 Device None (Room air)     Pt. To room 2908 from bed from Cath lab. Right radial TR band on. 11cc of air per cath lab RNs. Integerlin gtt infusion upon arrival @ 12.8. Pt. VSS, patient denies pain, patient oriented to room. Pt. Updated on POC, denies questions. Pt. Given toiletries, denies further needs. Bed in lowest position, bed alarm activated, call light and bedside table within reach. Will continue to monitor.     Rachel Burrell RN

## 2023-02-03 NOTE — OP NOTE
Patient:  James Fiore   :   1965    Procedural Summary  ~Consent:   Obtained written and verbal consent      Risks/benefits explained in detail  ~Procedure:    Left Heart Catheterization, coronary angiogram, moderate sedation  ~Medications:    Procedural sedation with minimal conscious sedation  ~Complications:   None  ~Blood Loss:    <10cc  ~Specimens:    None obtained  ~Pre-sedation re-evaluation: Performed immediately prior to procedure. ~bleeding risk:   Low    Medication and Procedural Reconciliation:  An independent trained observer pushed medications at my direction. We monitored the patient's level of consciousness and vital signs/physiologic status throughout the procedure duration (see start and stop times below). Sedation: 5.5 mg Versed, 175 mcg Fentanyl  Sedation start: 1019  Sedation stop: 1112    Cardiac Cath :   Anatomy:   LM-Patent   LAD-70% proximal LAD eccentric  Cx-patent  OM- patent  RCA-patent  RPDA- patent  LVEF- not performed  LVG- not performed  LVEDP- 14 mmHg    Contrast: 70 ml  Flouro Time: 7.4 min   Access: right radial artery    Impression  ~Coronary Angiography w/ single vessel moderate disease  ~Uncontrolled hypertension     Recommendation  ~Aggressive medical treatment and risk factor modification  ~FFR of LAD    MATTHEW HORN, DO2/3/2023 11:16 AM

## 2023-02-03 NOTE — BRIEF OP NOTE
Cardiac Cath/PCI 2/3/2023:  Access: Existing Right RA  Hemostasis: TR band    Moderate Sedation:  Start time: 1113  Stop time: 1221  4.5 mg versed   125 mcg fentanyl   An independent trained observer pushed medications at my direction. We monitored the patient's level of consciousness and vital signs/physiologic status throughout the procedure duration (see start and start times above). Bleeding risk: Low  LVEDP: 14 mmHg  AO: 165/83 mmHg  Estimated blood loss: Less than 20 mL  Contrast: 65 mL  Fluoroscopy time: Total of 19.1 min. (Diagnostic and PCI)    Anatomy:   LAD: 60 to 70% proximal LAD with FFR of 0.78. PCI: IVUS guided PCI of the proximal and mid LAD with a 2.5 mm x 28 mm Xience [place] MARITZA and a more distally in overlapping 2.75 mm x 15 mm Xience [address] MARITZA. Consultation carried out with a 3.25 mm NC balloon. Some myocardial bridging noted. Impression:  1. Significant stenosis of the LAD with successful revascularization with MARITZA x 1. Plan:  DAPT for 6 to 12 months. 2.   Antihypertensive medication adjustments by Dr. Good Boudreaux.

## 2023-02-04 VITALS
WEIGHT: 177 LBS | HEIGHT: 68 IN | OXYGEN SATURATION: 98 % | HEART RATE: 89 BPM | TEMPERATURE: 98.3 F | SYSTOLIC BLOOD PRESSURE: 155 MMHG | DIASTOLIC BLOOD PRESSURE: 58 MMHG | RESPIRATION RATE: 20 BRPM | BODY MASS INDEX: 26.83 KG/M2

## 2023-02-04 LAB
ANION GAP SERPL CALCULATED.3IONS-SCNC: 8 MMOL/L (ref 3–16)
BUN BLDV-MCNC: 8 MG/DL (ref 7–20)
CALCIUM SERPL-MCNC: 8.7 MG/DL (ref 8.3–10.6)
CHLORIDE BLD-SCNC: 102 MMOL/L (ref 99–110)
CO2: 25 MMOL/L (ref 21–32)
CREAT SERPL-MCNC: 1 MG/DL (ref 0.9–1.3)
GFR SERPL CREATININE-BSD FRML MDRD: >60 ML/MIN/{1.73_M2}
GLUCOSE BLD-MCNC: 112 MG/DL (ref 70–99)
HCT VFR BLD CALC: 40.9 % (ref 40.5–52.5)
HEMOGLOBIN: 13.9 G/DL (ref 13.5–17.5)
MCH RBC QN AUTO: 31 PG (ref 26–34)
MCHC RBC AUTO-ENTMCNC: 34 G/DL (ref 31–36)
MCV RBC AUTO: 91.1 FL (ref 80–100)
PDW BLD-RTO: 13.1 % (ref 12.4–15.4)
PLATELET # BLD: 217 K/UL (ref 135–450)
PMV BLD AUTO: 9.1 FL (ref 5–10.5)
POTASSIUM SERPL-SCNC: 3.8 MMOL/L (ref 3.5–5.1)
RBC # BLD: 4.49 M/UL (ref 4.2–5.9)
SODIUM BLD-SCNC: 135 MMOL/L (ref 136–145)
WBC # BLD: 10.6 K/UL (ref 4–11)

## 2023-02-04 PROCEDURE — 96375 TX/PRO/DX INJ NEW DRUG ADDON: CPT

## 2023-02-04 PROCEDURE — 80048 BASIC METABOLIC PNL TOTAL CA: CPT

## 2023-02-04 PROCEDURE — 85027 COMPLETE CBC AUTOMATED: CPT

## 2023-02-04 PROCEDURE — 6360000002 HC RX W HCPCS: Performed by: INTERNAL MEDICINE

## 2023-02-04 PROCEDURE — 6370000000 HC RX 637 (ALT 250 FOR IP): Performed by: INTERNAL MEDICINE

## 2023-02-04 PROCEDURE — 99239 HOSP IP/OBS DSCHRG MGMT >30: CPT | Performed by: NURSE PRACTITIONER

## 2023-02-04 PROCEDURE — G0378 HOSPITAL OBSERVATION PER HR: HCPCS

## 2023-02-04 PROCEDURE — 2580000003 HC RX 258: Performed by: INTERNAL MEDICINE

## 2023-02-04 RX ORDER — CLOPIDOGREL BISULFATE 75 MG/1
75 TABLET ORAL DAILY
Qty: 90 TABLET | Refills: 3 | Status: SHIPPED | OUTPATIENT
Start: 2023-02-04 | End: 2023-02-16 | Stop reason: SDUPTHER

## 2023-02-04 RX ORDER — AMLODIPINE BESYLATE 5 MG/1
5 TABLET ORAL DAILY
Qty: 30 TABLET | Refills: 3 | Status: SHIPPED | OUTPATIENT
Start: 2023-02-05 | End: 2023-02-16 | Stop reason: SDUPTHER

## 2023-02-04 RX ADMIN — SODIUM CHLORIDE, PRESERVATIVE FREE 10 ML: 5 INJECTION INTRAVENOUS at 08:42

## 2023-02-04 RX ADMIN — METOPROLOL SUCCINATE 25 MG: 25 TABLET, EXTENDED RELEASE ORAL at 08:42

## 2023-02-04 RX ADMIN — ASPIRIN 81 MG: 81 TABLET, CHEWABLE ORAL at 08:42

## 2023-02-04 RX ADMIN — CLOPIDOGREL BISULFATE 75 MG: 75 TABLET ORAL at 08:42

## 2023-02-04 RX ADMIN — ATORVASTATIN CALCIUM 80 MG: 80 TABLET, FILM COATED ORAL at 08:42

## 2023-02-04 RX ADMIN — HYDRALAZINE HYDROCHLORIDE 10 MG: 20 INJECTION INTRAMUSCULAR; INTRAVENOUS at 06:29

## 2023-02-04 RX ADMIN — AMLODIPINE BESYLATE 5 MG: 5 TABLET ORAL at 08:42

## 2023-02-04 ASSESSMENT — PAIN SCALES - GENERAL: PAINLEVEL_OUTOF10: 0

## 2023-02-04 NOTE — PROGRESS NOTES
Pt discharged home in stable condition with belongings to wife. Pt to  meds at [facility] in route to home. Verbalizes understanding of all instructions.

## 2023-02-04 NOTE — CARE COORDINATION
Discharge Planning Note:    Chart reviewed and it appears that patient has minimal needs for discharge at this time. Discussed with patients nurse and requested that case management be notified if discharge needs are identified.     - Current discharge plan is for the patient to return home. Case management will continue to follow progress and update discharge plan as needed.       Risk of Readmission Score: 7%    UYEN Gomez RN    [facility]  Phone: [phone]

## 2023-02-04 NOTE — PROGRESS NOTES
Resting in bed, assessment completed. Right radial site soft to touch, dressing dry and intact. Denies needs will monitor.

## 2023-02-04 NOTE — DISCHARGE SUMMARY
[address]    DISCHARGE SUMMARY      Patient ID:  Diane Vazquez  [id number] 72 y.o. 1965    Admit date: 2/3/2023    Discharge date:  2/4/23    Admitting Physician: Bibiana Castellanos DO     Discharge NP: Victor Manuel Garza APRN - CNP    Admission Diagnoses: Left bundle-branch block, unspecified [I44.7]  Cardiomyopathy, unspecified [I42.9]  Atherosclerotic heart disease of native coronary artery without angina pectoris [I25.10]  Coronary atherosclerosis due to lipid rich plaque [I25.83]  Coronary artery disease due to lipid rich plaque [I25.10, I25.83]    Discharge Diagnoses:   Patient Active Problem List   Diagnosis    MTHFR mutation    ED (erectile dysfunction)    Gastroesophageal reflux disease    Essential hypertension    Mixed hyperlipidemia    FH: CAD (coronary artery disease)    DANO (acute kidney injury) ([facility] [address]    LBBB (left bundle branch block)    Cardiomyopathy ([facility] [address]    Alcohol use    Coronary artery disease due to lipid rich plaque         Discharged Condition: good    Hospital Course: Diane Vazquez is a 60 y.o. male with significant past medical history of essential hypertension, hyperlipidemia who presents with shortness of breath and fatigue. He is a former smoker. He states that his symptoms worsen with exertion and improve with rest. He states that he is fatigued. Presented for [facility]. Coronary artery disease   ~ [facility] 2/3/23: s/p PCI of LAD with MARITZA. Some myocardial bridging noted. ~ continue aspirin, plavix, bb, statin     Hypertension   ~ started on Norvasc 5. Recommend home BP monitoring. Hyperlipidemia   ~ continue high intensity statin     At discharge, pt reports feeling well. Denies CP, SOB, palpations, edema, lightheadedness. Angina left shoulder/arm tightness. Denies recurrence. Right radial procedure site c/d/I. No hematoma or bruising. Good pulses, color, warmth, and sensation to that extremity.    Discussed risk factor modifications including cardiac diet, exercise, and medication compliance. Pt feels eager to dc home. Medications and discharge instructions reviewed. All questions and concerns addressed    Consults: IP CONSULT TO CARDIAC REHAB  IP CONSULT TO CARDIAC REHAB    The patient was seen and examined. Notes, labs, and recent testing reviewed. There were not complications over night. The patient is being seen for coronary artery disease. Objective:     BP (!) 155/58   Pulse 89   Temp 98.3 °F (36.8 °C) (Oral)   Resp 20   Ht 5' 8\" (1.727 m)   Wt 177 lb (80.3 kg)   SpO2 98%   BMI 26.91 kg/m²    No intake or output data in the 24 hours ending 23 1042    Physical Exam:  General:  Awake, alert, NAD  Skin:  Warm and dry. Right radial procedure site c/d/I. No hematoma or bruising. Good pulses, color, warmth, and sensation to that extremity. Neck:  JVD<8, no bruit  Chest:  Clear to auscultation, no wheezes/rhonchi/rales  Cardiovascular:  RRR S1S2  Abdomen:  Soft, nontender, +bowel sounds  Extremities:  no edema    Significant Diagnostic Studies:     EC/3/23  Normal sinus rhythm  Left axis deviation  Left bundle branch block  When compared with ECG of 2023 12:48,  No significant change was found    Cardiac Cath :  2/3/23   Anatomy:   LM-Patent   LAD-70% proximal LAD eccentric  Cx-patent  OM- patent  RCA-patent  RPDA- patent  LVEF- not performed  LVG- not performed  LVEDP- 14 mmHg     Contrast: 70 ml  Flouro Time: 7.4 min   Access: right radial artery     Impression  ~Coronary Angiography w/ single vessel moderate disease  ~Uncontrolled hypertension      Recommendation  ~Aggressive medical treatment and risk factor modification  ~FFR of LAD      Anatomy:   LAD: 60 to 70% proximal LAD with FFR of 0.78. PCI: IVUS guided PCI of the proximal and mid LAD with a 2.5 mm x 28 mm Xience Catalina MARITZA and a more distally in overlapping 2.75 mm x 15 mm Xience [address] MARITZA. Consultation carried out with a 3.25 mm NC balloon.   Some myocardial bridging noted.     Impression:  1. Significant stenosis of the LAD with successful revascularization with MARITZA x 1. Plan:  DAPT for 6 to 12 months. 2.   Antihypertensive medication adjustments by Dr. Zachary Burns. Labs:   Lab Results   Component Value Date    CREATININE 1.0 02/04/2023    BUN 8 02/04/2023     (L) 02/04/2023    K 3.8 02/04/2023     02/04/2023    CO2 25 02/04/2023      Lab Results   Component Value Date    WBC 10.6 02/04/2023    HGB 13.9 02/04/2023    HCT 40.9 02/04/2023    MCV 91.1 02/04/2023     02/04/2023    No results found for: INR, PROTIME No results found for: BNP  CARDIAC ENZYMES:No results for input(s): CKMB, CKMBINDEX, TROPONINI in the last 72 hours. Invalid input(s): CKTOTAL;3  FASTING LIPID PANEL:  Lab Results   Component Value Date/Time    CHOL 168 06/07/2021 12:00 AM    HDL 69 06/07/2021 12:00 AM    TRIG 156 06/07/2021 12:00 AM       Disposition: home    Patient Instructions:        Medication List        START taking these medications      amLODIPine 5 MG tablet  Commonly known as: NORVASC  Take 1 tablet by mouth daily  Start taking on: February 5, 2023     aspirin 81 MG chewable tablet  Take 1 tablet by mouth daily  Replaces: aspirin 81 MG EC tablet  Notes to patient: Use: prevents heart attacks and strokes. Side effects: bleeding or bruising more easily, stomach upset.      clopidogrel 75 MG tablet  Commonly known as: PLAVIX  Take 1 tablet by mouth daily            CONTINUE taking these medications      ascorbic acid 250 MG tablet  Commonly known as: VITAMIN C  Notes to patient: Use: dietary supplement  Side effects: diarrhea, nausea     atorvastatin 80 MG tablet  Commonly known as: Lipitor  Take 1 tablet by mouth daily  Notes to patient: Atorvastatin (Lipitor)  Use: lower cholesterol; prevent heart attack/stroke  Side effects: headache, muscle pain/weakness     b complex vitamins capsule  Notes to patient: Use: treatment of anemia and/or vitamin B12 deficiency  Side effects: dizziness, headache, anxiety, nausea     B-12 1000 MCG Caps  Notes to patient: Use: treatment of anemia and/or vitamin B12 deficiency  Side effects: dizziness, headache, anxiety, nausea     magnesium oxide 400 (240 Mg) MG tablet  Commonly known as: MAG-OX  Take 1 tablet by mouth daily  Notes to patient: Use:Dietary Supplement  Side effects: Nausea, diarrhea     metoprolol succinate 25 MG extended release tablet  Commonly known as: TOPROL XL  Take 1 tablet by mouth in the morning and at bedtime     omeprazole 20 MG delayed release capsule  Commonly known as: PRILOSEC  Notes to patient: Use: prevention and treatment of gastric ulcers and/or heartburn  Side effects: headache, fatigue, constipation, dry mouth      tadalafil 20 MG tablet  Commonly known as: CIALIS     vitamin D3 10 MCG (400 UNIT) Tabs tablet  Commonly known as: CHOLECALCIFEROL  Notes to patient:  Use: prevention and treatment of low vitamin D  Side effects: muscle weakness/twitching fatigue, constipation, upset stomach            STOP taking these medications      aspirin 81 MG EC tablet  Replaced by: aspirin 81 MG chewable tablet               Where to Get Your Medications        These medications were sent to [facility], [address] [place] [address]      Phone: [phone]   aspirin 81 MG chewable tablet  magnesium oxide 400 (240 Mg) MG tablet       These medications were sent to [facility] , [address] [place] [address] [phone]  [address] [place] [id number]      Phone: [phone]   amLODIPine 5 MG tablet  clopidogrel 75 MG tablet       The patient is on a beta-blocker  The patient is not on an ace-i/ARB  The patient is on a statin  The patient is on antiplatelet therapy  The patient does not have history of AF, and is not on anticoagulation    1. Overall the patient is stable from CV standpoint  2. Most recent cardiac testing has been reviewed as above. Further testing is not required at this time. 3. The patient is not currently smoking. The risks related to smoking were reviewed with the patient. Recommend maintaining a smoke-free lifestyle. Products available for smoking cessation were discussed in detail. Activity: no lifting, Driving, or Strenuous exercise for 1wk  Diet: cardiac diet  Wound Care: keep wound clean and dry    Follow-up with [facility] in 1-2 weeks. Recommend follow up with PCP in 3-4 weeks. Signed:  ZARIA Kline CNP, 2/4/2023, 10:42 AM  Time spent on discharge of patient: >31 minutes, including plan of care, patient education, and care coordination. NOTE:  This report was transcribed using voice recognition software. Every effort was made to ensure accuracy; however, inadvertent computerized transcription errors may be present.

## 2023-02-13 NOTE — PROGRESS NOTES
[address]     Outpatient Follow Up Note    CHIEF COMPLAINT / HPI: Hospital Follow Up secondary to status post coronary artery stenting     Pedro Ramos is 60 y.o. male who presents today for a routine follow up after a recent hospitalization. Subjective: Pedro Ramos has a significant past medical history of essential hypertension, hyperlipidemia, LBBB. Pt had complaints of fatigue and DAVIS. Underwent an echo that showed EF 45-50% and coronary CTA that showed extensive plaque. [facility] 2/3/23: s/p PCI of LAD with MARITZA. Some myocardial bridging noted. Hx of left shoulder injury in his 25s. He thinks discomfort in his shoulder is more related to his shoulder injury in the past as opposed to his heart. Today, he denies significant chest pain. There is no SOB/DAVIS. The patient denies orthopnea/PND. Denies swelling and his weight is stable. The patient is not experiencing palpitations or dizziness. With regard to medication therapy the patient has been compliant with prescribed regimen. They have tolerated therapy to date.      Past Medical History:   Diagnosis Date    ED (erectile dysfunction)     FH: CAD (coronary artery disease)     GERD (gastroesophageal reflux disease)     HLD (hyperlipidemia)     HTN (hypertension)     MTHFR mutation     heterozygous; takes vitamins (B6, B12, folate)     Social History:    Social History     Tobacco Use   Smoking Status Former    Packs/day: 1.00    Years: 30.00    Pack years: 30.00    Types: Cigarettes    Quit date: 2015    Years since quittin.1   Smokeless Tobacco Never     Current Medications:  Current Outpatient Medications   Medication Sig Dispense Refill    amLODIPine (NORVASC) 5 MG tablet Take 1 tablet by mouth daily 90 tablet 3    clopidogrel (PLAVIX) 75 MG tablet Take 1 tablet by mouth daily 90 tablet 3    pantoprazole (PROTONIX) 20 MG tablet Take 1 tablet by mouth every morning (before breakfast) 90 tablet 0    lisinopril (PRINIVIL;ZESTRIL) 2.5 MG tablet Take 1 tablet by mouth daily 30 tablet 1    magnesium oxide (MAG-OX) 400 (240 Mg) MG tablet Take 1 tablet by mouth daily 90 tablet 3    aspirin 81 MG chewable tablet Take 1 tablet by mouth daily 90 tablet 3    metoprolol succinate (TOPROL XL) 25 MG extended release tablet Take 1 tablet by mouth in the morning and at bedtime 180 tablet 3    atorvastatin (LIPITOR) 80 MG tablet Take 1 tablet by mouth daily 90 tablet 1    tadalafil (CIALIS) 20 MG tablet as needed      b complex vitamins capsule Take 1 capsule by mouth daily      ascorbic acid (VITAMIN C) 250 MG tablet Take 250 mg by mouth daily      vitamin D3 (CHOLECALCIFEROL) 10 MCG (400 UNIT) TABS tablet Take 400 Units by mouth daily      Cyanocobalamin (B-12) 1000 MCG CAPS Take  by mouth. No current facility-administered medications for this visit. REVIEW OF SYSTEMS:   CONSTITUTIONAL: No major weight gain or loss, night sweats, fever, fatigue, or weakness. HEENT: No new vision difficulties or ringing in the ears. RESPIRATORY: No new SOB, PND, orthopnea or cough. CARDIOVASCULAR: See HPI  GI: No N/V/D, constipation, or abdominal pain. No black/tarry stools  : No urinary urgency, incontinence, or hematuria. SKIN: No cyanosis or skin lesions. MUSCULOSKELETAL: No new muscle or joint pain. NEUROLOGICAL: No syncope or TIA-like symptoms.   PSYCHIATRIC: No anxiety, pain, insomnia or depression    Objective:   PHYSICAL EXAM:    Wt Readings from Last 3 Encounters:   02/16/23 172 lb (78 kg)   02/03/23 177 lb (80.3 kg)   02/01/23 176 lb (79.8 kg)     BP Readings from Last 3 Encounters:   02/16/23 126/68   02/04/23 (!) 155/58   02/01/23 (!) 148/94     Pulse Readings from Last 3 Encounters:   02/16/23 67   02/04/23 89   02/01/23 67         Vitals:    02/16/23 0956 02/16/23 1020   BP: 115/64 126/68   Site: Left Upper Arm    Position: Sitting    Cuff Size: Medium Adult    Pulse: 67    SpO2: 97%    Weight: 172 lb (78 kg)    Height: 5' 8\" (1.727 m)       VITALS:  /68   Pulse 67   Ht 5' 8\" (1.727 m)   Wt 172 lb (78 kg)   SpO2 97%   BMI 26.15 kg/m²     CONSTITUTIONAL: Cooperative, no apparent distress, and appears well nourished / developed  NEUROLOGIC:  Awake and oriented to person, place, and time. PSYCH: Calm affect. SKIN: Warm and dry. Right radial procedure site c/d/I. No hematoma or bruising. Good pulses, color, warmth, and sensation to that extremity. HEENT: Sclera non-icteric, normocephalic, neck supple. RESPIRATORY:  No increased work of breathing and clear to auscultation, no crackles or wheezing. CARDIOVASCULAR:  Regular rate and rhythm without murmur. Normal S1 and S2. No gallops or rubs. Normal PMI. No elevation of JVP. Normal carotid pulses with no bruits. GI:  Normal bowel sounds. Non-distended. Non-tender to palpation  EXT: No edema. No calf tenderness. Pulses are present bilaterally.     DATA:    Lab Results   Component Value Date    ALT 25 10/06/2022    AST 29 10/06/2022    ALKPHOS 66 10/06/2022    BILITOT 1.1 (H) 10/06/2022     Lab Results   Component Value Date    CREATININE 1.0 02/04/2023    BUN 8 02/04/2023     (L) 02/04/2023    K 3.8 02/04/2023     02/04/2023    CO2 25 02/04/2023     No results found for: TSH, T9WOTSO, E0XHFIM, THYROIDAB  Lab Results   Component Value Date    WBC 10.6 02/04/2023    HGB 13.9 02/04/2023    HCT 40.9 02/04/2023    MCV 91.1 02/04/2023     02/04/2023     No components found for: CHLPL  Lab Results   Component Value Date    TRIG 156 06/07/2021     Lab Results   Component Value Date    HDL 69 06/07/2021     Lab Results   Component Value Date    LDLCALC 99 06/07/2021     No results found for: LABVLDL  Lab Results   Component Value Date/Time    PROBNP 122 10/06/2022 11:34 AM     Radiology Review:  Pertinent images / reports were reviewed as a part of this visit and reveals the following:    Last Echo: 11/18/22  Summary   -Limited parasternal views secondary to lung interface.   -Normal left ventricle size, wall thickness and mildly reduced systolic   function with an estimated ejection fraction of 45-50%. -Abnormal (paradoxical) septal motion is present likely due to left bundle   branch block.   -There is reversal of E/A inflow velocities across the mitral valve. -Impaired relaxation compatible with grade I diastolic   dysfunction. E/e\"=8.75.   -GLS -17.1% (borderline normal). -Mild mitral regurgitation.   -Trivial tricuspid regurgitation.   -Unable to estimate pulmonary artery pressure secondary to incomplete TR jet   envelope. Cardiac Cath :  2/3/23   Anatomy:   LM-Patent   LAD-70% proximal LAD eccentric  Cx-patent  OM- patent  RCA-patent  RPDA- patent  LVEF- not performed  LVG- not performed  LVEDP- 14 mmHg     Contrast: 70 ml  Flouro Time: 7.4 min   Access: right radial artery     Impression  ~Coronary Angiography w/ single vessel moderate disease  ~Uncontrolled hypertension      Recommendation  ~Aggressive medical treatment and risk factor modification  ~FFR of LAD        Anatomy:   LAD: 60 to 70% proximal LAD with FFR of 0.78. PCI: IVUS guided PCI of the proximal and mid LAD with a 2.5 mm x 28 mm Xience Catalina MARITZA and a more distally in overlapping 2.75 mm x 15 mm Xience [address] MARTIZA. Consultation carried out with a 3.25 mm NC balloon. Some myocardial bridging noted. Impression:  1. Significant stenosis of the LAD with successful revascularization with MARITZA x 1. Plan:  DAPT for 6 to 12 months. 2.   Antihypertensive medication adjustments by Dr. Remi Song. Assessment:     Coronary artery disease   ~ [facility] 2/3/23: s/p PCI of LAD with MARITZA. Some myocardial bridging noted.   ~ continue aspirin, plavix, bb, statin, CCB  ~ stable; denies CP or SOB      Ischemic cardiomyopathy   ~ echo 11/2022 with EF 45-50%  ~ continues Toprol   ~ stable; appears compensated   ~ plan for repeat limited echo in a couple months     Hypertension   ~ controlled   ~ on norvasc 5, toprol 25mg BID     Hyperlipidemia   ~ suboptimal; LDL 99 (6/2021) on lipitor 80     Patient  is not stable since hospital discharge. Plan:     Start lisinopril 2.5mg daily. Decrease Toprol to once a day to allow for more BP room   Monitor home blood pressure. Complete fasting blood work 1 week after starting lisinopril. (LFTs/ lipids/ BMP). We may need to intensify your cholesterol medication. Start Protonix in place of Prilosec. Take 12hrs apart from plavix. Further refills of Protonix should come from your PCP. Cardiac Rehabilitation   Follow up as planned 3/29 with Dr. Brynda Heimlich. Call or mychart message me how your BP is doing in 1-2 weeks. I have addressed the patient's cardiac risk factors and adjusted pharmacologic treatment as needed. In addition, I have reinforced the need for patient directed risk factor modification. Further evaluation will be based upon the patient's clinical course and testing results. All questions and concerns were addressed to the patient. Alternatives to my treatment were discussed. The patient verbalizes understanding not to stop medications without discussing with us. The patient is not currently smoking. The risks related to smoking were reviewed with the patient. Recommend maintaining a smoke-free lifestyle. Products available for smoking cessation were discussed. Patient is on a beta blocker   Patient is on an ACEi  Patient is on a statin    Dual Antiplatelet therapy has been recommended / prescribed for this patient. Education conducted on adverse reactions including bleeding were discussed. Discussed exercise: 30min of sustained cardiovascular exercise most days of the week   Discussed Low saturated fat / Cholesterol diet. Thank you for allowing us to participate in the care of Wei Aaron.     Indira ENCISO-NEHEMIAS  [facility]   Office: [phone]    Documentation of today's visit sent to PCP    NOTE:  This report was transcribed using voice recognition software. Every effort was made to ensure accuracy; however, inadvertent computerized transcription errors may be present.

## 2023-02-16 ENCOUNTER — OFFICE VISIT (OUTPATIENT)
Dept: CARDIOLOGY CLINIC | Age: 58
End: 2023-02-16
Payer: COMMERCIAL

## 2023-02-16 VITALS
OXYGEN SATURATION: 97 % | HEIGHT: 68 IN | BODY MASS INDEX: 26.07 KG/M2 | SYSTOLIC BLOOD PRESSURE: 126 MMHG | WEIGHT: 172 LBS | DIASTOLIC BLOOD PRESSURE: 68 MMHG | HEART RATE: 67 BPM

## 2023-02-16 DIAGNOSIS — I25.83 CORONARY ARTERY DISEASE DUE TO LIPID RICH PLAQUE: Primary | ICD-10-CM

## 2023-02-16 DIAGNOSIS — I10 ESSENTIAL HYPERTENSION: ICD-10-CM

## 2023-02-16 DIAGNOSIS — E78.2 MIXED HYPERLIPIDEMIA: ICD-10-CM

## 2023-02-16 DIAGNOSIS — I25.10 CORONARY ARTERY DISEASE DUE TO LIPID RICH PLAQUE: Primary | ICD-10-CM

## 2023-02-16 DIAGNOSIS — I25.5 ISCHEMIC CARDIOMYOPATHY: ICD-10-CM

## 2023-02-16 PROCEDURE — 99214 OFFICE O/P EST MOD 30 MIN: CPT | Performed by: NURSE PRACTITIONER

## 2023-02-16 PROCEDURE — 3074F SYST BP LT 130 MM HG: CPT | Performed by: NURSE PRACTITIONER

## 2023-02-16 PROCEDURE — 3078F DIAST BP <80 MM HG: CPT | Performed by: NURSE PRACTITIONER

## 2023-02-16 RX ORDER — PANTOPRAZOLE SODIUM 20 MG/1
20 TABLET, DELAYED RELEASE ORAL
Qty: 90 TABLET | Refills: 0 | Status: SHIPPED | OUTPATIENT
Start: 2023-02-16

## 2023-02-16 RX ORDER — LISINOPRIL 2.5 MG/1
2.5 TABLET ORAL DAILY
Qty: 30 TABLET | Refills: 1 | Status: SHIPPED | OUTPATIENT
Start: 2023-02-16

## 2023-02-16 RX ORDER — CLOPIDOGREL BISULFATE 75 MG/1
75 TABLET ORAL DAILY
Qty: 90 TABLET | Refills: 3 | Status: SHIPPED | OUTPATIENT
Start: 2023-02-16

## 2023-02-16 RX ORDER — AMLODIPINE BESYLATE 5 MG/1
5 TABLET ORAL DAILY
Qty: 90 TABLET | Refills: 3 | Status: SHIPPED | OUTPATIENT
Start: 2023-02-16

## 2023-02-16 NOTE — PATIENT INSTRUCTIONS
Start lisinopril 2.5mg daily. Decrease Toprol to once a day to allow for more BP room for new medications. Monitor home blood pressure. Goal is less than 140/80 but to remain greater than 100/50. Heart rate to remain >55 beats per minute. Create a log and bring to office visits. Complete fasting blood work 1 week after starting lisinopril. Fast for 12 hours prior, may have black coffee or water. We may need to intensify your cholesterol medication. Start Protonix in place of Prilosec. Take 12hrs apart from plavix. Further refills of Protonix should come from your PCP. Try to eat a plant-based or Mediterranean-like diet that is high in vegetables, fruits, nuts, lean meats (pereferabley fish). Stay away from processed foods. The goal is to do sustained cardiovascular exercise for 30 minutes most days of the week. Start out slow. Cardiac Rehabilitation - [facility]  [address] [address]  [facility], [address] [facility]  [phone]    Follow up as planned 3/29 with Dr. Neil Washington. Call or mychart message me how your BP is doing in 1-2 weeks.

## 2023-02-20 RX ORDER — PANTOPRAZOLE SODIUM 20 MG/1
20 TABLET, DELAYED RELEASE ORAL
Qty: 90 TABLET | Refills: 0 | OUTPATIENT
Start: 2023-02-20

## 2023-02-20 NOTE — TELEPHONE ENCOUNTER
Sent to pharmacy 2/16/23 APRIL  Last filled:   Disp Refills Start End    pantoprazole (PROTONIX) 20 MG tablet 90 tablet 0 2/16/2023     Sig - Route:  Take 1 tablet by mouth every morning (before breakfast) - Oral    Sent to pharmacy as: Pantoprazole Sodium 20 MG Oral Tablet Delayed Release (PROTONIX)    E-Prescribing Status: Receipt confirmed by pharmacy (2/16/2023 10:24 AM EST)

## 2023-02-28 ENCOUNTER — HOSPITAL ENCOUNTER (OUTPATIENT)
Age: 58
Discharge: HOME | End: 2023-02-28
Payer: COMMERCIAL

## 2023-02-28 DIAGNOSIS — I25.83 CORONARY ARTERY DISEASE DUE TO LIPID RICH PLAQUE: ICD-10-CM

## 2023-02-28 DIAGNOSIS — I25.10 CORONARY ARTERY DISEASE DUE TO LIPID RICH PLAQUE: ICD-10-CM

## 2023-02-28 DIAGNOSIS — E78.2 MIXED HYPERLIPIDEMIA: ICD-10-CM

## 2023-02-28 LAB
ALT SERPL-CCNC: 25 U/L (ref 10–40)
ANION GAP SERPL CALCULATED.3IONS-SCNC: 15 MMOL/L (ref 3–16)
AST SERPL-CCNC: 25 U/L (ref 15–37)
BUN BLDV-MCNC: 21 MG/DL (ref 7–20)
CALCIUM SERPL-MCNC: 9.8 MG/DL (ref 8.3–10.6)
CHLORIDE BLD-SCNC: 98 MMOL/L (ref 99–110)
CHOLESTEROL, FASTING: 147 MG/DL (ref 0–199)
CO2: 24 MMOL/L (ref 21–32)
CREAT SERPL-MCNC: 1.4 MG/DL (ref 0.9–1.3)
GFR SERPL CREATININE-BSD FRML MDRD: 58 ML/MIN/{1.73_M2}
GLUCOSE BLD-MCNC: 105 MG/DL (ref 70–99)
HDLC SERPL-MCNC: 38 MG/DL (ref 40–60)
LDL CHOLESTEROL CALCULATED: 63 MG/DL
POTASSIUM SERPL-SCNC: 4.5 MMOL/L (ref 3.5–5.1)
SODIUM BLD-SCNC: 137 MMOL/L (ref 136–145)
TRIGLYCERIDE, FASTING: 232 MG/DL (ref 0–150)
VLDLC SERPL CALC-MCNC: 46 MG/DL

## 2023-02-28 PROCEDURE — 84460 ALANINE AMINO (ALT) (SGPT): CPT

## 2023-02-28 PROCEDURE — 84450 TRANSFERASE (AST) (SGOT): CPT

## 2023-02-28 PROCEDURE — 80048 BASIC METABOLIC PNL TOTAL CA: CPT

## 2023-02-28 PROCEDURE — 80061 LIPID PANEL: CPT

## 2023-02-28 PROCEDURE — 36415 COLL VENOUS BLD VENIPUNCTURE: CPT

## 2023-03-02 RX ORDER — PANTOPRAZOLE SODIUM 20 MG/1
20 TABLET, DELAYED RELEASE ORAL
Qty: 90 TABLET | Refills: 0 | OUTPATIENT
Start: 2023-03-02

## 2023-03-03 ENCOUNTER — TELEPHONE (OUTPATIENT)
Dept: CARDIOLOGY CLINIC | Age: 58
End: 2023-03-03

## 2023-03-03 DIAGNOSIS — Z79.899 HIGH RISK MEDICATION USE: Primary | ICD-10-CM

## 2023-03-17 ENCOUNTER — HOSPITAL ENCOUNTER (OUTPATIENT)
Age: 58
Discharge: HOME | End: 2023-03-17
Payer: COMMERCIAL

## 2023-03-17 DIAGNOSIS — Z79.899 HIGH RISK MEDICATION USE: ICD-10-CM

## 2023-03-17 LAB
ANION GAP SERPL CALCULATED.3IONS-SCNC: 14 MMOL/L (ref 3–16)
BUN SERPL-MCNC: 15 MG/DL (ref 7–20)
CALCIUM SERPL-MCNC: 9.8 MG/DL (ref 8.3–10.6)
CHLORIDE SERPL-SCNC: 100 MMOL/L (ref 99–110)
CO2 SERPL-SCNC: 24 MMOL/L (ref 21–32)
CREAT SERPL-MCNC: 1.4 MG/DL (ref 0.9–1.3)
GFR SERPLBLD CREATININE-BSD FMLA CKD-EPI: 58 ML/MIN/{1.73_M2}
GLUCOSE SERPL-MCNC: 126 MG/DL (ref 70–99)
POTASSIUM SERPL-SCNC: 4.5 MMOL/L (ref 3.5–5.1)
SODIUM SERPL-SCNC: 138 MMOL/L (ref 136–145)

## 2023-03-17 PROCEDURE — 80048 BASIC METABOLIC PNL TOTAL CA: CPT

## 2023-03-17 PROCEDURE — 36415 COLL VENOUS BLD VENIPUNCTURE: CPT

## 2023-03-20 NOTE — TELEPHONE ENCOUNTER
Last OV:03/17/2023  Fariha Mcintyre  Last Labs: Bmp 03/17/2023  Next OV: 03/29/2023  Louisa  Last Refill: 02/16/2023  Fariha Mcintyre

## 2023-03-21 RX ORDER — LISINOPRIL 2.5 MG/1
2.5 TABLET ORAL DAILY
Qty: 30 TABLET | Refills: 1 | Status: SHIPPED | OUTPATIENT
Start: 2023-03-21

## 2023-05-16 NOTE — TELEPHONE ENCOUNTER
Last OV:02/16/2023  Fariha Bosch  Last Labs:bmp-03/17/12023  Fariha pineda  Next OV:none  Last Refill: lisnopril-03/21/2023  Fariha [place]ch

## 2023-05-17 RX ORDER — LISINOPRIL 2.5 MG/1
2.5 TABLET ORAL DAILY
Qty: 30 TABLET | Refills: 1 | Status: SHIPPED | OUTPATIENT
Start: 2023-05-17

## 2023-06-05 DIAGNOSIS — E78.5 DYSLIPIDEMIA: ICD-10-CM

## 2023-06-05 NOTE — TELEPHONE ENCOUNTER
Medication:   Requested Prescriptions     Pending Prescriptions Disp Refills    atorvastatin (LIPITOR) 80 MG tablet [Pharmacy Med Name: ATORVASTATIN TABS 80MG] 90 tablet 3     Sig: TAKE 1 TABLET DAILY       Last Filled:  12.05.2022 #90 w/ 1 refill     Patient Phone Number: [phone] (home)     Last appt: 12/5/2022 Return in about 3 months (around 3/5/2023).    Next appt: Visit date not found - My Chart apt reminder sent    Last Lipid:   Lab Results   Component Value Date/Time    CHOL 168 06/07/2021 12:00 AM    TRIG 156 06/07/2021 12:00 AM    HDL 38 02/28/2023 09:27 AM    LDLCALC 63 02/28/2023 09:27 AM

## 2023-06-06 RX ORDER — ATORVASTATIN CALCIUM 80 MG/1
TABLET, FILM COATED ORAL
Qty: 90 TABLET | Refills: 0 | Status: SHIPPED | OUTPATIENT
Start: 2023-06-06

## 2023-06-06 NOTE — TELEPHONE ENCOUNTER
Called patient to schedule an office visit.   Patient stated he is in the process of finding a new Dr and does not want Dr. Dotti Aschoff as his Dr anymore

## 2023-06-07 NOTE — TELEPHONE ENCOUNTER
Ricardo Lantigua removed as PCP per patient request.   PCP listed as \"Awaiting Assignment\". No further action is needed for this encounter.

## 2023-07-17 DIAGNOSIS — Z79.899 HIGH RISK MEDICATION USE: Primary | ICD-10-CM

## 2023-07-17 RX ORDER — LISINOPRIL 2.5 MG/1
2.5 TABLET ORAL DAILY
Qty: 30 TABLET | Refills: 1 | Status: SHIPPED | OUTPATIENT
Start: 2023-07-17 | End: 2023-07-21 | Stop reason: SDUPTHER

## 2023-07-19 ENCOUNTER — HOSPITAL ENCOUNTER (OUTPATIENT)
Age: 58
Discharge: HOME | End: 2023-07-19
Payer: COMMERCIAL

## 2023-07-19 DIAGNOSIS — Z79.899 HIGH RISK MEDICATION USE: ICD-10-CM

## 2023-07-19 LAB
ANION GAP SERPL CALCULATED.3IONS-SCNC: 14 MMOL/L (ref 3–16)
BUN SERPL-MCNC: 19 MG/DL (ref 7–20)
CALCIUM SERPL-MCNC: 10.1 MG/DL (ref 8.3–10.6)
CHLORIDE SERPL-SCNC: 97 MMOL/L (ref 99–110)
CO2 SERPL-SCNC: 25 MMOL/L (ref 21–32)
CREAT SERPL-MCNC: 1.3 MG/DL (ref 0.9–1.3)
GFR SERPLBLD CREATININE-BSD FMLA CKD-EPI: >60 ML/MIN/{1.73_M2}
GLUCOSE SERPL-MCNC: 92 MG/DL (ref 70–99)
POTASSIUM SERPL-SCNC: 4.7 MMOL/L (ref 3.5–5.1)
SODIUM SERPL-SCNC: 136 MMOL/L (ref 136–145)

## 2023-07-19 PROCEDURE — 80048 BASIC METABOLIC PNL TOTAL CA: CPT

## 2023-07-19 PROCEDURE — 36415 COLL VENOUS BLD VENIPUNCTURE: CPT

## 2023-07-21 ENCOUNTER — TELEPHONE (OUTPATIENT)
Dept: CARDIOLOGY CLINIC | Age: 58
End: 2023-07-21

## 2023-07-21 RX ORDER — LISINOPRIL 2.5 MG/1
2.5 TABLET ORAL DAILY
Qty: 90 TABLET | Refills: 1 | Status: SHIPPED | OUTPATIENT
Start: 2023-07-21 | End: 2023-08-28 | Stop reason: SDUPTHER

## 2023-07-21 NOTE — TELEPHONE ENCOUNTER
----- Message from ZARIA Johnson CNP sent at 7/21/2023  2:38 PM EDT -----  Blood work looks stable. Will send in refill for lisinopril.

## 2023-08-17 DIAGNOSIS — E78.5 DYSLIPIDEMIA: ICD-10-CM

## 2023-08-17 RX ORDER — ATORVASTATIN CALCIUM 80 MG/1
TABLET, FILM COATED ORAL
Qty: 90 TABLET | Refills: 3 | OUTPATIENT
Start: 2023-08-17

## 2023-08-17 NOTE — TELEPHONE ENCOUNTER
Medication:   Requested Prescriptions     Pending Prescriptions Disp Refills    atorvastatin (LIPITOR) 80 MG tablet [Pharmacy Med Name: ATORVASTATIN TABS 80MG] 90 tablet 3     Sig: TAKE 1 TABLET DAILY       Last Filled: 06/06/2023 #90 with 0 refills     Patient Phone Number: [phone] (home)     Last appt: 12/5/2022   Next appt: Visit date not found    Last Lipid:   Lab Results   Component Value Date/Time    CHOL 168 06/07/2021 12:00 AM    TRIG 156 06/07/2021 12:00 AM    HDL 38 02/28/2023 09:27 AM    LDLCALC 63 02/28/2023 09:27 AM

## 2023-08-21 DIAGNOSIS — E78.5 DYSLIPIDEMIA: ICD-10-CM

## 2023-08-21 RX ORDER — ATORVASTATIN CALCIUM 80 MG/1
80 TABLET, FILM COATED ORAL DAILY
Qty: 90 TABLET | Refills: 3 | Status: SHIPPED | OUTPATIENT
Start: 2023-08-21

## 2023-08-28 RX ORDER — LISINOPRIL 2.5 MG/1
2.5 TABLET ORAL DAILY
Qty: 90 TABLET | Refills: 1 | Status: SHIPPED | OUTPATIENT
Start: 2023-08-28

## 2023-09-11 ENCOUNTER — TELEPHONE (OUTPATIENT)
Dept: CARDIOLOGY CLINIC | Age: 58
End: 2023-09-11

## 2023-09-11 DIAGNOSIS — E78.2 MIXED HYPERLIPIDEMIA: Primary | ICD-10-CM

## 2023-09-11 NOTE — TELEPHONE ENCOUNTER
Called pt. He is not having any problems, just wants to make 6 mos fu. Appt made for 10/9/23 at 9am with Dr Elijah Church and lab orders placed for lipids/liver.  He will come to [facility] to have labs drawn

## 2023-09-11 NOTE — TELEPHONE ENCOUNTER
Pt called requesting appt with [facility] for sept follow up appt, pt is also checking to see if they need to get labs done before their appointment? Is there a date and time to schedule pt with D[facility]?     Pls advise thank you

## 2023-10-05 RX ORDER — PANTOPRAZOLE SODIUM 20 MG/1
20 TABLET, DELAYED RELEASE ORAL
Qty: 90 TABLET | Refills: 3 | OUTPATIENT
Start: 2023-10-05

## 2023-10-06 ENCOUNTER — HOSPITAL ENCOUNTER (OUTPATIENT)
Age: 58
Discharge: HOME | End: 2023-10-06
Payer: COMMERCIAL

## 2023-10-06 DIAGNOSIS — E78.2 MIXED HYPERLIPIDEMIA: ICD-10-CM

## 2023-10-06 LAB
ALBUMIN SERPL-MCNC: 4.5 G/DL (ref 3.4–5)
ALP SERPL-CCNC: 69 U/L (ref 40–129)
ALT SERPL-CCNC: 17 U/L (ref 10–40)
AST SERPL-CCNC: 22 U/L (ref 15–37)
BILIRUB DIRECT SERPL-MCNC: <0.2 MG/DL (ref 0–0.3)
BILIRUB INDIRECT SERPL-MCNC: NORMAL MG/DL (ref 0–1)
BILIRUB SERPL-MCNC: 0.5 MG/DL (ref 0–1)
CHOLEST SERPL-MCNC: 158 MG/DL (ref 0–199)
HDLC SERPL-MCNC: 46 MG/DL (ref 40–60)
LDLC SERPL CALC-MCNC: 57 MG/DL
PROT SERPL-MCNC: 6.7 G/DL (ref 6.4–8.2)
TRIGL SERPL-MCNC: 277 MG/DL (ref 0–150)
VLDLC SERPL CALC-MCNC: 55 MG/DL

## 2023-10-06 PROCEDURE — 80076 HEPATIC FUNCTION PANEL: CPT

## 2023-10-06 PROCEDURE — 80061 LIPID PANEL: CPT

## 2023-10-06 PROCEDURE — 36415 COLL VENOUS BLD VENIPUNCTURE: CPT

## 2023-10-08 PROBLEM — I25.5 ISCHEMIC CARDIOMYOPATHY: Status: ACTIVE | Noted: 2023-10-08

## 2023-10-08 ASSESSMENT — ENCOUNTER SYMPTOMS: SHORTNESS OF BREATH: 0

## 2023-10-08 NOTE — PATIENT INSTRUCTIONS
May stop plavix in 2/4/24, but continue asa daily  Decrease sugars and carbohydrates in diet  Recommend daily exercise, increase steps by 1000 a day  Repeat lipid in 6 months

## 2023-10-08 NOTE — PROGRESS NOTES
[facility]  10/9/23  Referring:no pcp    REASON FOR CONSULT/CHIEF COMPLAINT/HPI     Reason for visit/ Chief complaint  Follow up   LBB CAD   HPI Dakotah Miguel is a 60 y.o. seen as a follow up for management of CAD. He has a history of LBBB (diagnosed ), hypertension, hyperlipidemia, GERD. He is legally blind in his left eye secondary to central retinal ocular occlusion ( after falling on a treadmill going full speed). Suffers from alcohol use disorder, avascular necrosis. Grandfather  in  on the cath lab table    Katie Altman quit smoking in 2016  (1 ppd x 30 years) smokes marijuana daily, drinks couple beers a day. Was a heavy drinker for 30 years. Two weeks ago he had a bloody nose. Went to urgent care, who sent him to [address] [place] [address] [facility]. He has not had any nosebleed since. No blood in stool or urine. He has no chest pain shortness of breath palpitations or dizziness. Walks 2.5 miles a day with his dog. He has a fitbit, states he gets 2500 steps a day    Patient is adherent with medications and is tolerating them well without side effects     HISTORY/ALLERGIES/ROS     MedHx:  has a past medical history of ED (erectile dysfunction), FH: CAD (coronary artery disease), GERD (gastroesophageal reflux disease), HLD (hyperlipidemia), HTN (hypertension), and MTHFR mutation. SurgHx:  has a past surgical history that includes Skin cancer excision (2015); Uvulectomy (); Spine surgery; and [place] tooth extraction. SocHx:  reports that he quit smoking about 8 years ago. His smoking use included cigarettes. He has a 30.00 pack-year smoking history. He has never used smokeless tobacco. He reports current alcohol use of about 14.0 standard drinks of alcohol per week. He reports current drug use. Frequency: 7.00 times per week. Drug: Marijuana [place]). FamHx: Father with premature cad  Allergies: Methimazole   ROS:   Review of Systems   Constitutional:  Positive for fatigue.    Respiratory:  Negative

## 2023-10-09 ENCOUNTER — OFFICE VISIT (OUTPATIENT)
Dept: CARDIOLOGY CLINIC | Age: 58
End: 2023-10-09
Payer: COMMERCIAL

## 2023-10-09 VITALS
HEART RATE: 63 BPM | BODY MASS INDEX: 24.02 KG/M2 | HEIGHT: 68 IN | DIASTOLIC BLOOD PRESSURE: 62 MMHG | SYSTOLIC BLOOD PRESSURE: 104 MMHG | WEIGHT: 158.5 LBS | OXYGEN SATURATION: 97 %

## 2023-10-09 DIAGNOSIS — K21.9 GASTROESOPHAGEAL REFLUX DISEASE, UNSPECIFIED WHETHER ESOPHAGITIS PRESENT: ICD-10-CM

## 2023-10-09 DIAGNOSIS — I25.5 ISCHEMIC CARDIOMYOPATHY: ICD-10-CM

## 2023-10-09 DIAGNOSIS — I25.83 CORONARY ARTERY DISEASE DUE TO LIPID RICH PLAQUE: Primary | ICD-10-CM

## 2023-10-09 DIAGNOSIS — E78.2 MIXED HYPERLIPIDEMIA: ICD-10-CM

## 2023-10-09 DIAGNOSIS — I25.10 CORONARY ARTERY DISEASE DUE TO LIPID RICH PLAQUE: Primary | ICD-10-CM

## 2023-10-09 DIAGNOSIS — I10 ESSENTIAL HYPERTENSION: ICD-10-CM

## 2023-10-09 PROCEDURE — 3074F SYST BP LT 130 MM HG: CPT | Performed by: INTERNAL MEDICINE

## 2023-10-09 PROCEDURE — 99214 OFFICE O/P EST MOD 30 MIN: CPT | Performed by: INTERNAL MEDICINE

## 2023-10-09 PROCEDURE — 3078F DIAST BP <80 MM HG: CPT | Performed by: INTERNAL MEDICINE

## 2023-10-09 RX ORDER — TESTOSTERONE CYPIONATE 200 MG/ML
INJECTION, SOLUTION INTRAMUSCULAR
COMMUNITY
Start: 2023-09-28

## 2023-10-09 RX ORDER — SILDENAFIL CITRATE 20 MG/1
20 TABLET ORAL PRN
COMMUNITY

## 2023-10-09 RX ORDER — TAMSULOSIN HYDROCHLORIDE 0.4 MG/1
CAPSULE ORAL
COMMUNITY
Start: 2023-08-25

## 2024-02-01 RX ORDER — LISINOPRIL 2.5 MG/1
2.5 TABLET ORAL DAILY
Qty: 90 TABLET | Refills: 3 | Status: SHIPPED | OUTPATIENT
Start: 2024-02-01

## 2024-02-01 NOTE — TELEPHONE ENCOUNTER
Last ov:10/09/23 DKW  Next ov:24 DKW  Last EK/3/23  Last labs:10/6/23  Last filled:   Disp Refills Start End    lisinopril (PRINIVIL;ZESTRIL) 2.5 MG tablet 90 tablet 1 2023 --    Sig - Route: Take 1 tablet by mouth daily - Oral    Sent to pharmacy as: Lisinopril 2.5 MG Oral Tablet (PRINIVIL;ZESTRIL)    E-Prescribing Status: Receipt confirmed by pharmacy (2023  9:30 PM EDT)

## 2024-02-12 RX ORDER — AMLODIPINE BESYLATE 5 MG/1
5 TABLET ORAL DAILY
Qty: 90 TABLET | Refills: 3 | Status: SHIPPED | OUTPATIENT
Start: 2024-02-12

## 2024-02-12 RX ORDER — CLOPIDOGREL BISULFATE 75 MG/1
75 TABLET ORAL DAILY
Qty: 90 TABLET | Refills: 1 | Status: SHIPPED | OUTPATIENT
Start: 2024-02-12

## 2024-02-12 RX ORDER — METOPROLOL SUCCINATE 25 MG/1
TABLET, EXTENDED RELEASE ORAL
Qty: 180 TABLET | Refills: 3 | Status: SHIPPED | OUTPATIENT
Start: 2024-02-12

## 2024-02-12 NOTE — TELEPHONE ENCOUNTER
Refill request METOPROLOL SUCCINATE ER TABS 25MG     Last OV:10/9/23 DKW    Last EK/3/23    Next Appt:24 DKW    metoprolol succinate (TOPROL XL) 25 MG extended release tablet [[id number]]    Order Details  Dose: 25 mg Route: Oral Frequency: 2 times daily   Dispense Quantity: 180 tablet Refills: 3          Sig: Take 1 tablet by mouth in the morning and at bedtime         Start Date: 23 End Date: --   Written Date: 23 Expiration Date: 24   Original Order: metoprolol succinate (TOPROL XL) 25 MG extended release tablet [[id number]]   Providers    Authorizing Provider: Toni Jasso DO NPI: [id number]   Ordering User: Iris Hdez RN    Cosigner: Toni Jasso DO Signed: 2023 15:41

## 2024-02-12 NOTE — TELEPHONE ENCOUNTER
Refill request CLOPIDOGREL BISULFATE TABS 75MG     Last OV:10/9/23 DKW    Last Lab:10/6/23 LIPID    Last EK/3/23    Next Appt:24 DKW    clopidogrel (PLAVIX) 75 MG tablet [[id number]]    Order Details  Dose: 75 mg Route: Oral Frequency: DAILY   Dispense Quantity: 90 tablet Refills: 3          Sig: Take 1 tablet by mouth daily         Start Date: 23 End Date: --   Written Date: 23 Expiration Date: 24   Original Order: clopidogrel (PLAVIX) 75 MG tablet [[id number]]   Providers    Authorizing Provider: Kandace Connor APRN - CNP NPI: [id number]   Ordering User: Kandace Connor APRN - CNP

## 2024-02-29 RX ORDER — OMEPRAZOLE 20 MG/1
20 CAPSULE, DELAYED RELEASE ORAL
Qty: 90 CAPSULE | Refills: 3 | Status: SHIPPED | OUTPATIENT
Start: 2024-02-29

## 2024-02-29 NOTE — TELEPHONE ENCOUNTER
Received refill request for omeprazole (PRILOSEC) 20 MG delayed release capsule  from [facility].     Last OV: 10- DKW    Next OV: 4- DKW    Last Labs: 10-6-2023 Hepatic Function Panel    Last Filled: 3- DKW

## 2024-04-15 ASSESSMENT — ENCOUNTER SYMPTOMS: SHORTNESS OF BREATH: 0

## 2024-04-16 ENCOUNTER — OFFICE VISIT (OUTPATIENT)
Dept: CARDIOLOGY CLINIC | Age: 59
End: 2024-04-16
Payer: COMMERCIAL

## 2024-04-16 ENCOUNTER — HOSPITAL ENCOUNTER (OUTPATIENT)
Age: 59
Discharge: HOME | End: 2024-04-16
Payer: COMMERCIAL

## 2024-04-16 VITALS
BODY MASS INDEX: 24.1 KG/M2 | HEART RATE: 65 BPM | HEIGHT: 68 IN | DIASTOLIC BLOOD PRESSURE: 72 MMHG | WEIGHT: 159 LBS | SYSTOLIC BLOOD PRESSURE: 124 MMHG | OXYGEN SATURATION: 98 %

## 2024-04-16 DIAGNOSIS — I25.83 CORONARY ARTERY DISEASE DUE TO LIPID RICH PLAQUE: Primary | ICD-10-CM

## 2024-04-16 DIAGNOSIS — E78.2 MIXED HYPERLIPIDEMIA: ICD-10-CM

## 2024-04-16 DIAGNOSIS — I25.10 CORONARY ARTERY DISEASE DUE TO LIPID RICH PLAQUE: Primary | ICD-10-CM

## 2024-04-16 DIAGNOSIS — I25.5 ISCHEMIC CARDIOMYOPATHY: ICD-10-CM

## 2024-04-16 DIAGNOSIS — I44.7 LBBB (LEFT BUNDLE BRANCH BLOCK): ICD-10-CM

## 2024-04-16 DIAGNOSIS — E78.5 DYSLIPIDEMIA: ICD-10-CM

## 2024-04-16 DIAGNOSIS — I25.10 CORONARY ARTERY DISEASE DUE TO LIPID RICH PLAQUE: ICD-10-CM

## 2024-04-16 DIAGNOSIS — Z78.9 ALCOHOL USE: ICD-10-CM

## 2024-04-16 DIAGNOSIS — I25.83 CORONARY ARTERY DISEASE DUE TO LIPID RICH PLAQUE: ICD-10-CM

## 2024-04-16 DIAGNOSIS — K21.9 GASTROESOPHAGEAL REFLUX DISEASE, UNSPECIFIED WHETHER ESOPHAGITIS PRESENT: ICD-10-CM

## 2024-04-16 DIAGNOSIS — I10 ESSENTIAL HYPERTENSION: ICD-10-CM

## 2024-04-16 LAB
ALBUMIN SERPL-MCNC: 4.5 G/DL (ref 3.4–5)
ANION GAP SERPL CALCULATED.3IONS-SCNC: 11 MMOL/L (ref 3–16)
BUN SERPL-MCNC: 16 MG/DL (ref 7–20)
CALCIUM SERPL-MCNC: 9.5 MG/DL (ref 8.3–10.6)
CHLORIDE SERPL-SCNC: 103 MMOL/L (ref 99–110)
CHOLEST SERPL-MCNC: 179 MG/DL (ref 0–199)
CO2 SERPL-SCNC: 27 MMOL/L (ref 21–32)
CREAT SERPL-MCNC: 1.2 MG/DL (ref 0.8–1.3)
GFR SERPLBLD CREATININE-BSD FMLA CKD-EPI: 69 ML/MIN/{1.73_M2}
GLUCOSE SERPL-MCNC: 93 MG/DL (ref 70–99)
HDLC SERPL-MCNC: 58 MG/DL (ref 40–60)
LDLC SERPL CALC-MCNC: 87 MG/DL
PHOSPHATE SERPL-MCNC: 3.6 MG/DL (ref 2.5–4.9)
POTASSIUM SERPL-SCNC: 4.3 MMOL/L (ref 3.5–5.1)
SODIUM SERPL-SCNC: 141 MMOL/L (ref 136–145)
TRIGL SERPL-MCNC: 170 MG/DL (ref 0–150)
VLDLC SERPL CALC-MCNC: 34 MG/DL

## 2024-04-16 PROCEDURE — 80069 RENAL FUNCTION PANEL: CPT

## 2024-04-16 PROCEDURE — 80061 LIPID PANEL: CPT

## 2024-04-16 PROCEDURE — 3078F DIAST BP <80 MM HG: CPT | Performed by: INTERNAL MEDICINE

## 2024-04-16 PROCEDURE — 83036 HEMOGLOBIN GLYCOSYLATED A1C: CPT

## 2024-04-16 PROCEDURE — 99214 OFFICE O/P EST MOD 30 MIN: CPT | Performed by: INTERNAL MEDICINE

## 2024-04-16 PROCEDURE — 36415 COLL VENOUS BLD VENIPUNCTURE: CPT

## 2024-04-16 PROCEDURE — 3074F SYST BP LT 130 MM HG: CPT | Performed by: INTERNAL MEDICINE

## 2024-04-16 RX ORDER — METOPROLOL SUCCINATE 25 MG/1
25 TABLET, EXTENDED RELEASE ORAL 2 TIMES DAILY
Qty: 180 TABLET | Refills: 3 | Status: SHIPPED | OUTPATIENT
Start: 2024-04-16

## 2024-04-16 RX ORDER — ATORVASTATIN CALCIUM 80 MG/1
80 TABLET, FILM COATED ORAL DAILY
Qty: 90 TABLET | Refills: 3 | Status: SHIPPED | OUTPATIENT
Start: 2024-04-16

## 2024-04-16 RX ORDER — AMLODIPINE BESYLATE 5 MG/1
5 TABLET ORAL DAILY
Qty: 90 TABLET | Refills: 3 | Status: SHIPPED | OUTPATIENT
Start: 2024-04-16

## 2024-04-16 RX ORDER — LISINOPRIL 2.5 MG/1
2.5 TABLET ORAL DAILY
Qty: 90 TABLET | Refills: 3 | Status: SHIPPED | OUTPATIENT
Start: 2024-04-16

## 2024-04-16 NOTE — PROGRESS NOTES
shortness of breath.    All other systems reviewed and are negative.         MEDICATIONS      Prior to Admission medications    Medication Sig Start Date End Date Taking? Authorizing Provider   omeprazole (PRILOSEC) 20 MG delayed release capsule TAKE 1 CAPSULE EVERY MORNING BEFORE BREAKFAST 2/29/24  Yes Toni Jasso DO   amLODIPine (NORVASC) 5 MG tablet TAKE 1 TABLET DAILY 2/12/24  Yes Toni Jasso DO   metoprolol succinate (TOPROL XL) 25 MG extended release tablet TAKE 1 TABLET IN THE MORNING AND AT BEDTIME 2/12/24  Yes Toni Jasso DO   lisinopril (PRINIVIL;ZESTRIL) 2.5 MG tablet TAKE 1 TABLET DAILY 2/1/24  Yes Toni Jasso DO   Misc Natural Products (FIBER 7 PO) Take by mouth 2 times daily   Yes ProviderRomero MD   Fluticasone Propionate (FLONASE ALLERGY RELIEF NA) by Nasal route   Yes Provider, MD Romero   tamsulosin (FLOMAX) 0.4 MG capsule  8/25/23  Yes Provider, MD Romero   testosterone cypionate (DEPOTESTOTERONE CYPIONATE) 200 MG/ML injection  9/28/23  Yes Provider, MD Romero   sildenafil (REVATIO) 20 MG tablet Take 1 tablet by mouth as needed   Yes Provider, MD Romero   atorvastatin (LIPITOR) 80 MG tablet Take 1 tablet by mouth daily 8/21/23  Yes Toni Jasso DO   cetirizine (ZYRTEC) 10 MG tablet Take 1 tablet by mouth daily   Yes Provider, MD Romero   magnesium oxide (MAG-OX) 400 (240 Mg) MG tablet Take 1 tablet by mouth daily 2/3/23  Yes Toni Jasso DO   aspirin 81 MG chewable tablet Take 1 tablet by mouth daily 2/4/23  Yes Toni Jasso DO   b complex vitamins capsule Take 1 capsule by mouth daily   Yes Provider, MD Romero   ascorbic acid (VITAMIN C) 250 MG tablet Take 1 tablet by mouth daily   Yes Provider, MD Romero   vitamin D3 (CHOLECALCIFEROL) 10 MCG (400 UNIT) TABS tablet Take 1 tablet by mouth daily   Yes Provider, MD Romero   Cyanocobalamin (B-12) 1000 MCG CAPS Take  by mouth.   Yes Provider, MD Romero       PHYSICAL EXAM

## 2024-04-17 ENCOUNTER — TELEPHONE (OUTPATIENT)
Dept: CARDIOLOGY CLINIC | Age: 59
End: 2024-04-17

## 2024-04-17 LAB
EST. AVERAGE GLUCOSE BLD GHB EST-MCNC: 102.5 MG/DL
HBA1C MFR BLD: 5.2 %

## 2024-04-17 RX ORDER — EZETIMIBE 10 MG/1
10 TABLET ORAL DAILY
Qty: 90 TABLET | Refills: 3 | Status: SHIPPED | OUTPATIENT
Start: 2024-04-17 | End: 2024-04-17 | Stop reason: SDUPTHER

## 2024-04-17 RX ORDER — EZETIMIBE 10 MG/1
10 TABLET ORAL DAILY
Qty: 90 TABLET | Refills: 3 | Status: SHIPPED | OUTPATIENT
Start: 2024-04-17

## 2024-04-17 NOTE — RESULT ENCOUNTER NOTE
Please call and tell him that his A1c is good triglycerides are better but ldl is 87 and he would like that lower, less than 70. Recommend adding zetia 10 mg daily  Dkw/MM

## 2024-04-17 NOTE — TELEPHONE ENCOUNTER
----- Message from Iris Hdez RN sent at 4/17/2024 11:02 AM EDT -----  Please call and tell him that his A1c is good triglycerides are better but ldl is 87 and he would like that lower, less than 70. Recommend adding zetia 10 mg daily  Dkw/MM

## 2024-05-01 ENCOUNTER — HOSPITAL ENCOUNTER (OUTPATIENT)
Dept: NON INVASIVE DIAGNOSTICS | Age: 59
Discharge: HOME | End: 2024-05-01
Payer: COMMERCIAL

## 2024-05-01 DIAGNOSIS — I25.5 ISCHEMIC CARDIOMYOPATHY: ICD-10-CM

## 2024-05-01 PROCEDURE — 93306 TTE W/DOPPLER COMPLETE: CPT

## 2024-05-01 PROCEDURE — 93356 MYOCRD STRAIN IMG SPCKL TRCK: CPT

## 2025-01-17 NOTE — TELEPHONE ENCOUNTER
Received refill request for  CLOPIDOGREL BISULFATE TABS 75MG  from [facility].     Last OV: 4/16/24    Next OV: n/a    Last Labs: cbc 2/4/23    Last Filled: 4/16/24

## 2025-01-18 RX ORDER — CLOPIDOGREL BISULFATE 75 MG/1
75 TABLET ORAL DAILY
Qty: 90 TABLET | Refills: 3 | OUTPATIENT
Start: 2025-01-18

## 2025-02-24 RX ORDER — OMEPRAZOLE 20 MG/1
20 CAPSULE, DELAYED RELEASE ORAL
Qty: 90 CAPSULE | Refills: 3 | OUTPATIENT
Start: 2025-02-24

## 2025-02-24 NOTE — TELEPHONE ENCOUNTER
Received refill request for Omeprazole from [facility].    Last ov:04/16/2024 DKW    Last Refill:02/29/2024    Next appointment:On recall list for 05/01/2025 ALIYAH

## 2025-02-28 NOTE — TELEPHONE ENCOUNTER
Spoke to pt and relayed message per DKW/MMRN. Pt verbalized understanding.       Iris Hdez RN     Please let patient know if he is still having trouble with reflux, he will need to get this from his pcp, thank you

## 2025-03-20 RX ORDER — EZETIMIBE 10 MG/1
10 TABLET ORAL DAILY
Qty: 90 TABLET | Refills: 3 | Status: SHIPPED | OUTPATIENT
Start: 2025-03-20

## 2025-03-20 NOTE — TELEPHONE ENCOUNTER
Received refill request for  ezetimibe (ZETIA) 10 MG tablet  from [facility].     Last OV: 4/16/24    Next OV:     Last Labs:     Last Filled: 4/17/24

## 2025-04-14 RX ORDER — AMLODIPINE BESYLATE 5 MG/1
5 TABLET ORAL DAILY
Qty: 90 TABLET | Refills: 3 | Status: SHIPPED | OUTPATIENT
Start: 2025-04-14

## 2025-04-14 RX ORDER — EMPAGLIFLOZIN 10 MG/1
10 TABLET, FILM COATED ORAL DAILY
Qty: 90 TABLET | Refills: 3 | Status: SHIPPED | OUTPATIENT
Start: 2025-04-14

## 2025-04-14 RX ORDER — METOPROLOL SUCCINATE 25 MG/1
TABLET, EXTENDED RELEASE ORAL
Qty: 180 TABLET | Refills: 3 | Status: SHIPPED | OUTPATIENT
Start: 2025-04-14

## 2025-04-14 NOTE — TELEPHONE ENCOUNTER
Requested Prescriptions     Pending Prescriptions Disp Refills    JARDIANCE 10 MG tablet [Pharmacy Med Name: JARDIANCE TABS 10MG] 90 tablet 3     Sig: TAKE 1 TABLET DAILY    metoprolol succinate (TOPROL XL) 25 MG extended release tablet [Pharmacy Med Name: METOPROLOL SUCCINATE ER TABS 25MG] 180 tablet 3     Sig: TAKE 1 TABLET IN THE MORNING AND AT BEDTIME      Last OV:  4/16/2024 DKW    Next OV: 05/01/2025 Recall DKW    Last Labs: 02/03/2023 EKG, 07/19/2023 BMP    Last Filled: 05/09/2024, 04/16/2024 DKW

## 2025-04-14 NOTE — TELEPHONE ENCOUNTER
Received refill request for  amLODIPine (NORVASC) 5 MG tablet  from [facility] pharmacy.     Last OV: 4/16/2024 DKW    Next OV: None    Last Labs: 2/3/2023 EKG    Last Filled: 4/16/2024 DKW

## 2025-04-22 DIAGNOSIS — E78.5 DYSLIPIDEMIA: ICD-10-CM

## 2025-04-23 RX ORDER — ATORVASTATIN CALCIUM 80 MG/1
80 TABLET, FILM COATED ORAL DAILY
Qty: 90 TABLET | Refills: 3 | Status: SHIPPED | OUTPATIENT
Start: 2025-04-23

## 2025-04-23 RX ORDER — LISINOPRIL 2.5 MG/1
2.5 TABLET ORAL DAILY
Qty: 90 TABLET | Refills: 3 | Status: SHIPPED | OUTPATIENT
Start: 2025-04-23

## 2025-04-23 NOTE — TELEPHONE ENCOUNTER
Atorvastatin 80 mg  Lisinopril 2.5 mg    Last OV: 04/16/2024  Last labs/ EKG: lipid, ast, alt:  04/16/24 , CMP 07/19/23  Appt scheduled : LM for pt to schedule yearly ov.  Pt due in May.